# Patient Record
Sex: MALE | Employment: OTHER | URBAN - METROPOLITAN AREA
[De-identification: names, ages, dates, MRNs, and addresses within clinical notes are randomized per-mention and may not be internally consistent; named-entity substitution may affect disease eponyms.]

---

## 2023-08-21 LAB
ANION GAP IN SER/PLAS: 10 MMOL/L (ref 10–20)
CALCIUM (MG/DL) IN SER/PLAS: 9.5 MG/DL (ref 8.6–10.3)
CARBON DIOXIDE, TOTAL (MMOL/L) IN SER/PLAS: 28 MMOL/L (ref 21–32)
CHLORIDE (MMOL/L) IN SER/PLAS: 104 MMOL/L (ref 98–107)
CREATININE (MG/DL) IN SER/PLAS: 1.09 MG/DL (ref 0.5–1.3)
GFR MALE: 74 ML/MIN/1.73M2
GLUCOSE (MG/DL) IN SER/PLAS: 109 MG/DL (ref 74–99)
MAGNESIUM (MG/DL) IN SER/PLAS: 2.07 MG/DL (ref 1.6–2.4)
NATRIURETIC PEPTIDE B (PG/ML) IN SER/PLAS: 123 PG/ML (ref 0–99)
POTASSIUM (MMOL/L) IN SER/PLAS: 4.8 MMOL/L (ref 3.5–5.3)
SODIUM (MMOL/L) IN SER/PLAS: 137 MMOL/L (ref 136–145)
UREA NITROGEN (MG/DL) IN SER/PLAS: 12 MG/DL (ref 6–23)

## 2024-01-13 DIAGNOSIS — E78.5 DYSLIPIDEMIA: ICD-10-CM

## 2024-01-13 DIAGNOSIS — I50.20 HFREF (HEART FAILURE WITH REDUCED EJECTION FRACTION) (MULTI): ICD-10-CM

## 2024-01-13 DIAGNOSIS — I25.10 CORONARY ARTERY DISEASE INVOLVING NATIVE CORONARY ARTERY OF NATIVE HEART, UNSPECIFIED WHETHER ANGINA PRESENT: Primary | ICD-10-CM

## 2024-01-18 PROBLEM — I10 ESSENTIAL HYPERTENSION: Status: ACTIVE | Noted: 2024-01-18

## 2024-01-18 PROBLEM — E78.5 DYSLIPIDEMIA: Status: ACTIVE | Noted: 2024-01-18

## 2024-01-18 PROBLEM — I50.20 HFREF (HEART FAILURE WITH REDUCED EJECTION FRACTION) (MULTI): Status: ACTIVE | Noted: 2024-01-18

## 2024-01-18 PROBLEM — I25.10 CAD (CORONARY ARTERY DISEASE): Status: ACTIVE | Noted: 2024-01-18

## 2024-01-18 RX ORDER — ROSUVASTATIN CALCIUM 40 MG/1
40 TABLET, COATED ORAL DAILY
Qty: 90 TABLET | Refills: 0 | OUTPATIENT
Start: 2024-01-18

## 2024-01-18 RX ORDER — SPIRONOLACTONE 25 MG/1
25 TABLET ORAL DAILY
Qty: 30 TABLET | Refills: 1 | Status: SHIPPED | OUTPATIENT
Start: 2024-01-18 | End: 2024-03-28 | Stop reason: SDUPTHER

## 2024-01-18 RX ORDER — ROSUVASTATIN CALCIUM 40 MG/1
40 TABLET, COATED ORAL DAILY
Qty: 30 TABLET | Refills: 1 | Status: SHIPPED | OUTPATIENT
Start: 2024-01-18 | End: 2024-03-28 | Stop reason: SDUPTHER

## 2024-01-18 RX ORDER — ROSUVASTATIN CALCIUM 40 MG/1
40 TABLET, COATED ORAL DAILY
COMMUNITY
End: 2024-01-18 | Stop reason: SDUPTHER

## 2024-03-09 DIAGNOSIS — E78.5 DYSLIPIDEMIA: Primary | ICD-10-CM

## 2024-03-16 ENCOUNTER — LAB (OUTPATIENT)
Dept: LAB | Facility: LAB | Age: 69
End: 2024-03-16
Payer: MEDICARE

## 2024-03-16 DIAGNOSIS — I10 ESSENTIAL (PRIMARY) HYPERTENSION: ICD-10-CM

## 2024-03-16 DIAGNOSIS — E78.5 HYPERLIPIDEMIA, UNSPECIFIED: ICD-10-CM

## 2024-03-16 DIAGNOSIS — E66.9 OBESITY, UNSPECIFIED: ICD-10-CM

## 2024-03-16 DIAGNOSIS — I50.20 UNSPECIFIED SYSTOLIC (CONGESTIVE) HEART FAILURE (MULTI): ICD-10-CM

## 2024-03-16 DIAGNOSIS — I25.10 ATHEROSCLEROTIC HEART DISEASE OF NATIVE CORONARY ARTERY WITHOUT ANGINA PECTORIS: ICD-10-CM

## 2024-03-16 DIAGNOSIS — E78.5 HYPERLIPIDEMIA, UNSPECIFIED: Primary | ICD-10-CM

## 2024-03-16 LAB
ALBUMIN SERPL BCP-MCNC: 4.2 G/DL (ref 3.4–5)
ALP SERPL-CCNC: 63 U/L (ref 33–136)
ALT SERPL W P-5'-P-CCNC: 31 U/L (ref 10–52)
ANION GAP SERPL CALC-SCNC: 12 MMOL/L (ref 10–20)
AST SERPL W P-5'-P-CCNC: 23 U/L (ref 9–39)
BILIRUB SERPL-MCNC: 0.7 MG/DL (ref 0–1.2)
BNP SERPL-MCNC: 105 PG/ML (ref 0–99)
BUN SERPL-MCNC: 12 MG/DL (ref 6–23)
CALCIUM SERPL-MCNC: 9.1 MG/DL (ref 8.6–10.3)
CHLORIDE SERPL-SCNC: 104 MMOL/L (ref 98–107)
CHOLEST SERPL-MCNC: 99 MG/DL (ref 0–199)
CHOLESTEROL/HDL RATIO: 3
CO2 SERPL-SCNC: 25 MMOL/L (ref 21–32)
CREAT SERPL-MCNC: 1.02 MG/DL (ref 0.5–1.3)
EGFRCR SERPLBLD CKD-EPI 2021: 80 ML/MIN/1.73M*2
ERYTHROCYTE [DISTWIDTH] IN BLOOD BY AUTOMATED COUNT: 12.8 % (ref 11.5–14.5)
GLUCOSE SERPL-MCNC: 121 MG/DL (ref 74–99)
HCT VFR BLD AUTO: 44.5 % (ref 41–52)
HDLC SERPL-MCNC: 33.3 MG/DL
HGB BLD-MCNC: 15.5 G/DL (ref 13.5–17.5)
LDLC SERPL CALC-MCNC: 48 MG/DL
MAGNESIUM SERPL-MCNC: 1.9 MG/DL (ref 1.6–2.4)
MCH RBC QN AUTO: 33.2 PG (ref 26–34)
MCHC RBC AUTO-ENTMCNC: 34.8 G/DL (ref 32–36)
MCV RBC AUTO: 95 FL (ref 80–100)
NON HDL CHOLESTEROL: 66 MG/DL (ref 0–149)
NRBC BLD-RTO: 0 /100 WBCS (ref 0–0)
PLATELET # BLD AUTO: 153 X10*3/UL (ref 150–450)
POTASSIUM SERPL-SCNC: 4.3 MMOL/L (ref 3.5–5.3)
PROT SERPL-MCNC: 7 G/DL (ref 6.4–8.2)
RBC # BLD AUTO: 4.67 X10*6/UL (ref 4.5–5.9)
SODIUM SERPL-SCNC: 137 MMOL/L (ref 136–145)
TRIGL SERPL-MCNC: 89 MG/DL (ref 0–149)
VLDL: 18 MG/DL (ref 0–40)
WBC # BLD AUTO: 7 X10*3/UL (ref 4.4–11.3)

## 2024-03-16 PROCEDURE — 36415 COLL VENOUS BLD VENIPUNCTURE: CPT

## 2024-03-16 PROCEDURE — 83735 ASSAY OF MAGNESIUM: CPT

## 2024-03-16 PROCEDURE — 80061 LIPID PANEL: CPT

## 2024-03-16 PROCEDURE — 85027 COMPLETE CBC AUTOMATED: CPT

## 2024-03-16 PROCEDURE — 83880 ASSAY OF NATRIURETIC PEPTIDE: CPT

## 2024-03-16 PROCEDURE — 80053 COMPREHEN METABOLIC PANEL: CPT

## 2024-03-18 PROBLEM — E66.9 OBESITY (BMI 30-39.9): Status: ACTIVE | Noted: 2024-03-18

## 2024-03-18 RX ORDER — EZETIMIBE 10 MG/1
10 TABLET ORAL NIGHTLY
Qty: 90 TABLET | Refills: 3 | Status: SHIPPED | OUTPATIENT
Start: 2024-03-18

## 2024-03-18 NOTE — PROGRESS NOTES
[facility] Heart and Vascular Cardiology    Patient Name: Wing Stone  Patient : 1955      Scribe Attestation  By signing my name below, IDenisha Scribe   attest that this documentation has been prepared under the direction and in the presence of Shailesh Dash DO.      Reason for visit:  This is a 68-year-old male here for follow-up regarding HFrEF with an ejection fraction of 45%, coronary artery disease with bypass done in , hypertension, dyslipidemia, and obesity.     HPI:  This is a 68-year-old male here for follow-up regarding HFrEF with an ejection fraction of 45%, coronary artery disease with bypass done in , hypertension, dyslipidemia, and obesity.  The patient was last evaluated by me in 2023.  At that visit I increased losartan to 50 mg daily, ordered blood work including CMP/lipid/magnesium/CBC/BNP be drawn in 6 months, and asked the patient to follow-up in 6 months and sooner if necessary. CMP done on 3/16/24 showed normal serum sodium and serum potassium with a serum creatinine of 1.02, normal ALT/AST, serum magnesium 1.90, . CBC showed a hemoglobin of 15.5. Lipid panel done in 2024 showed an LDL of 48 and triglycerides of 89 currently on rosuvastatin 40 mg daily. ECG done today showed sinus rhythm with a heart rate of 64 bpm.  The patient reports that he has been feeling generally well from the cardiac standpoint. He denies any new chest pain, shortness of breath, palpitations and lightheadedness. He reports that his usual systolic blood pressure at home is in the 130s range. He states that he takes all of his medications as prescribed. During my exam, he was resting comfortably on the exam table.            Assessment/Plan:   1. HFrEF  The patient has a history of HFrEF with an ejection fraction of 45%.  Echocardiogram done on 2023 showed mildly decreased left ventricular systolic function with a 45% estimated ejection fraction,  apical and anterolateral segments appear hypokinetic. There is mildly reduced right ventricular systolic function.  He does not appear significantly volume overloaded on exam today except for trace to 1+ pitting bilateral lower extremity edema on exam today.  He should continue his current heart failure medications.   Recent lab works as noted in the HPI.  Lab works as noted below will be done in 6 months prior to his next visit.   I discussed with him the importance of following a low-sodium heart healthy diet as well as weight loss.   Follow up in 6 months and sooner if necessary.      2. Coronary artery disease  Patient has a history of coronary artery disease with prior bypass done in 2013.  ECG done today showed sinus rhythm with a heart rate of 64 bpm.    He denies anginal chest discomfort.  Blood pressure appears moderately controlled on exam today.  He reports that his usual systolic blood pressure at home is in the 130s range.   He should continue his current antihypertensive medications.   Echocardiogram done on 2/16/2023 showed mildly decreased left ventricular systolic function with a 45% estimated ejection fraction, apical and anterolateral segments appear hypokinetic. There is mildly reduced right ventricular systolic function.  Recent lab works as noted in the HPI.   Lipid panel done in March 2024 showed an LDL of 48 and triglycerides of 89 currently on rosuvastatin 40 mg daily.   Lab works as noted below will be done in 6 months prior to his next visit.   Please also see lifestyle recommendations below.  Follow up in 6 months and sooner if necessary.      3. Hypertension  The patient has a history of hypertension which appears moderately controlled on exam today.  He reports that his usual systolic blood pressure at home is in the 130s range.   He should continue his current antihypertensive medications.     4. Dyslipidemia  Lipid panel done in March 2024 showed an LDL of 48 and triglycerides of 89  currently on rosuvastatin 40 mg daily.   Please see lifestyle recommendations below.     5. Obesity  Please see lifestyle recommendations below.       Orders:   BMP/BNP/magnesium in 6 months,   Follow-up in 6 months.    Lifestyle Recommendations  I recommend a whole-food plant-based diet, an eating pattern that encourages the consumption of unrefined plant foods (such as fruits, vegetables, tubers, whole grains, legumes, nuts and seeds) and discourages meats, dairy products, eggs and processed foods.     The AHA/ACC recommends that the patient consume a dietary pattern that emphasizes intake of vegetables, fruits, and whole grains; includes low-fat dairy products, poultry, fish, legumes, non-tropical vegetable oils, and nuts; and limits intake of sodium, sweets, sugar-sweetened beverages, and red meats.  Adapt this dietary pattern to appropriate calorie requirements (a 500-750 kcal/day deficit to loose weight), personal and cultural food preferences, and nutrition therapy for other medical conditions (including diabetes).  Achieve this pattern by following plans such as the [place] Mediterranean, DASH dietary pattern, or AHA diet.     Engage in 2 hours and 30 minutes per week of moderate-intensity physical activity, or 1 hour and 15 minutes (75 minutes) per week of vigorous-intensity aerobic physical activity, or an equivalent combination of moderate and vigorous-intensity aerobic physical activity. Aerobic activity should be performed in episodes of at least 10 minutes preferably spread throughout the week.     Adhering to a heart healthy diet, regular exercise habits, avoidance of tobacco products, and maintenance of a healthy weight are crucial components of their heart disease risk reduction.     Any positive review of systems not specifically addressed in the office visit today should be evaluated and treated by the patients primary care physician or in an emergency department if necessary     Patient was  "notified that results from ordered tests will be called to the patient if it changes current management; it will otherwise be discussed at a future appointment and available on [facility].     Thank you for allowing me to participate in the care of this patient.        This document was generated using the assistance of voice recognition software. If there are any errors of spelling, grammar, syntax, or meaning; please feel free to contact me directly for clarification.    Past Medical History:  He has no past medical history on file.    Past Surgical History:  He has a past surgical history that includes Other surgical history (04/06/2022) and Other surgical history (08/11/2022).      Social History:  He has no history on file for tobacco use, alcohol use, and drug use.    Family History:  No family history on file.     Allergies:  Patient has no allergy information on record.    Outpatient Medications:  Current Outpatient Medications   Medication Instructions    rosuvastatin (CRESTOR) 40 mg, oral, Daily    spironolactone (ALDACTONE) 25 mg, oral, Daily        ROS:  A 14 point review of systems was done and is negative other than as stated in HPI    Vitals:      2/24/2022     4:06 PM 3/28/2022     4:03 PM 4/4/2022     3:40 PM 5/13/2022     3:47 PM 8/11/2022     1:18 PM 2/23/2023    12:41 PM 9/22/2023     1:24 PM   Vitals   Systolic 136 134 150 146 118 130 148   Diastolic 78 80 80 88 70 82 74   Heart Rate 68 66 70 74 54 64 59   Resp 16 16 16 16 16     Height (in) 1.753 m (5' 9\") 1.753 m (5' 9\") 1.753 m (5' 9\") 1.753 m (5' 9\") 1.753 m (5' 9\") 1.753 m (5' 9\") 1.753 m (5' 9\")   Weight (lb) 253 256 245 259 255.13 249 245.38   BMI 37.36 kg/m2 37.8 kg/m2 36.18 kg/m2 38.25 kg/m2 37.68 kg/m2 36.77 kg/m2 36.24 kg/m2   BSA (m2) 2.37 m2 2.38 m2 2.32 m2 2.39 m2 2.38 m2 2.35 m2 2.32 m2        Physical Exam:   Constitutional: Cooperative, in no acute distress, alert, appears stated age.  Skin: Skin color, texture, turgor normal. " No rashes or lesions.  Head: Normocephalic. No masses, lesions, tenderness or abnormalities  Eyes: Extraocular movements are grossly intact.  Mouth and throat: Mucous membranes moist  Neck: Neck supple, no carotid bruits, no JVD  Respiratory: Lungs clear to auscultation, no wheezing or rhonchi, no use of accessory muscles  Chest wall: No scars, normal excursion with respiration  Cardiovascular: Regular rhythm without murmur  Gastrointestinal: Abdomen soft, nontender. Bowel sounds normal.  Musculoskeletal: Strength equal in upper extremities  Extremities: Trace to 1+ pitting edema  Neurologic: Sensation grossly intact, alert and oriented ×3    Intake/Output:   No intake/output data recorded.    Outpatient Medications  Current Outpatient Medications on File Prior to Visit   Medication Sig Dispense Refill    rosuvastatin (Crestor) 40 mg tablet Take 1 tablet (40 mg) by mouth once daily. 30 tablet 1    spironolactone (Aldactone) 25 mg tablet Take 1 tablet (25 mg) by mouth once daily. 30 tablet 1     No current facility-administered medications on file prior to visit.       Labs: (past 26 weeks)  Recent Results (from the past 4368 hour(s))   B-Type Natriuretic Peptide    Collection Time: 03/16/24  8:38 AM   Result Value Ref Range     (H) 0 - 99 pg/mL   CBC    Collection Time: 03/16/24  8:38 AM   Result Value Ref Range    WBC 7.0 4.4 - 11.3 x10*3/uL    nRBC 0.0 0.0 - 0.0 /100 WBCs    RBC 4.67 4.50 - 5.90 x10*6/uL    Hemoglobin 15.5 13.5 - 17.5 g/dL    Hematocrit 44.5 41.0 - 52.0 %    MCV 95 80 - 100 fL    MCH 33.2 26.0 - 34.0 pg    MCHC 34.8 32.0 - 36.0 g/dL    RDW 12.8 11.5 - 14.5 %    Platelets 153 150 - 450 x10*3/uL   Comprehensive Metabolic Panel    Collection Time: 03/16/24  8:38 AM   Result Value Ref Range    Glucose 121 (H) 74 - 99 mg/dL    Sodium 137 136 - 145 mmol/L    Potassium 4.3 3.5 - 5.3 mmol/L    Chloride 104 98 - 107 mmol/L    Bicarbonate 25 21 - 32 mmol/L    Anion Gap 12 10 - 20 mmol/L    Urea  Nitrogen 12 6 - 23 mg/dL    Creatinine 1.02 0.50 - 1.30 mg/dL    eGFR 80 >60 mL/min/1.73m*2    Calcium 9.1 8.6 - 10.3 mg/dL    Albumin 4.2 3.4 - 5.0 g/dL    Alkaline Phosphatase 63 33 - 136 U/L    Total Protein 7.0 6.4 - 8.2 g/dL    AST 23 9 - 39 U/L    Bilirubin, Total 0.7 0.0 - 1.2 mg/dL    ALT 31 10 - 52 U/L   Lipid Panel    Collection Time: 03/16/24  8:38 AM   Result Value Ref Range    Cholesterol 99 0 - 199 mg/dL    HDL-Cholesterol 33.3 mg/dL    Cholesterol/HDL Ratio 3.0     LDL Calculated 48 <=99 mg/dL    VLDL 18 0 - 40 mg/dL    Triglycerides 89 0 - 149 mg/dL    Non HDL Cholesterol 66 0 - 149 mg/dL   Magnesium    Collection Time: 03/16/24  8:38 AM   Result Value Ref Range    Magnesium 1.90 1.60 - 2.40 mg/dL       ECG  No results found for this or any previous visit (from the past 4464 hour(s)).    Echocardiogram  No results found for this or any previous visit from the past 1095 days.      CV Studies:  EKG: No results found for this or any previous visit (from the past 4464 hour(s)).  Echocardiogram:   [place] [address]  Phone [phone] Fax [phone]    TRANSTHORACIC ECHOCARDIOGRAM REPORT      Patient Name:     DEBBIE Oleary Physician:  [id number] Jaleel Bowman MD  Study Date:       2/16/2023      Referring           JUAN DAVID AMBROSIO  Physician:  N/PID:          [id number]       PCP:  Accession/Order#: [id number]   [facility] Echo Lab  Location:  YOB: 1955      Fellow:  Gender:           M              Nurse:              Delaney Patel RN  Admit Date:                      Sonographer:        Bharati Carolina RUST  Admission Status: Outpatient     Additional Staff:  Height:           175.26 cm      CC Report to:  Weight:           108.86 kg      Study Type:         Echocardiogram  BSA:              2.23 m2  Blood Pressure: 118 /70 mmHg    Diagnosis/ICD: R94.31-Abnormal electrocardiogram  [ECG] [EKG];  I25.10-Atherosclerotic heart disease of native coronary artery  without angina pectoris; I50.20-Unspecified systolic (congestive)  heart failure (CHF)  Indication:    Congestive Heart Failure, Abnormal EKG  Procedure/CPT: Echo Complete w Full Doppler-[id number]    Patient History:  Pertinent History: CAD, CABG (2013), HTN.    Study Detail: The following Echo studies were performed: 2D, M-Mode, Doppler and  color flow. Technically challenging study due to poor acoustic  windows. Optison used as a contrast agent for endocardial border  definition. Total contrast used for this procedure was 3 mL via IV  push.      PHYSICIAN INTERPRETATION:  Left Ventricle: Left ventricular systolic function is mildly decreased, with an estimated ejection fraction of 45%. Wall motion is abnormal. The left ventricular cavity size is normal. Left ventricular diastolic filling was not assessed. Apical and anterolateral segments appear hypokinetic.  Left Atrium: The left atrium is normal in size.  Right Ventricle: The right ventricle is normal in size. There is mildly reduced right ventricular systolic function.  Right Atrium: The right atrium is normal in size.  Aortic Valve: The aortic valve is trileaflet. There is no evidence of aortic valve regurgitation. The peak instantaneous gradient of the aortic valve is 7.4 mmHg. The mean gradient of the aortic valve is 4.1 mmHg.  Mitral Valve: The mitral valve is normal in structure. There is trace mitral valve regurgitation.  Tricuspid Valve: The tricuspid valve is structurally normal. No evidence of tricuspid regurgitation.  Pulmonic Valve: The pulmonic valve is structurally normal. There is physiologic pulmonic valve regurgitation.  Pericardium: There is no pericardial effusion noted.  Aorta: The aortic root is normal.      CONCLUSIONS:  1. Left ventricular systolic function is mildly decreased with a 45% estimated ejection fraction.  2. Apical and anterolateral segments appear  hypokinetic.  3. There is mildly reduced right ventricular systolic function.    QUANTITATIVE DATA SUMMARY:  2D MEASUREMENTS:  Normal Ranges:  LAs:           4.15 cm    (2.7-4.0cm)  IVSd:          1.04 cm    (0.6-1.1cm)  LVPWd:         1.00 cm    (0.6-1.1cm)  LVIDd:         6.26 cm    (3.9-5.9cm)  LVIDs:         5.26 cm  LV Mass Index: 121.8 g/m2  LV % FS        16.0 %    LA VOLUME:  Normal Ranges:  LA Vol A4C:        58.9 ml    (22+/-6mL/m2)  LA Vol A2C:        53.2 ml  LA Vol BP:         56.4 ml  LA Vol Index A4C:  26.4 ml/m2  LA Vol Index A2C:  23.8 ml/m2  LA Vol Index BP:   25.3 ml/m2  LA Area A4C:       20.9 cm2  LA Area A2C:       19.7 cm2  LA Major Axis A4C: 6.3 cm  LA Major Axis A2C: 6.2 cm  LA Volume Index:   25.3 ml/m2  LA Vol A4C:        55.7 ml  LA Vol A2C:        50.0 ml    RA VOLUME BY A/L METHOD:  Normal Ranges:  RA Area A4C: 18.5 cm2    AORTA MEASUREMENTS:  Normal Ranges:  Ao Sinus, d: 3.60 cm (2.1-3.5cm)  Ao STJ, d:   2.90 cm (1.7-3.4cm)  Asc Ao, d:   3.10 cm (2.1-3.4cm)    LV SYSTOLIC FUNCTION BY 2D PLANIMETRY (MOD):  Normal Ranges:  EF-A4C View: 45.1 % (>=55%)  EF-A2C View: 38.9 %  EF-Biplane:  44.1 %    LV DIASTOLIC FUNCTION:  Normal Ranges:  MV Peak E:        1.09 m/s    (0.7-1.2 m/s)  MV Peak A:        0.88 m/s    (0.42-0.7 m/s)  E/A Ratio:        1.24        (1.0-2.2)  MV A Dur:         121.79 msec  PulmV A Revs Dur: 163.65 msec    MITRAL VALVE:  Normal Ranges:  MV DT: 241 msec (150-240msec)    MITRAL INSUFFICIENCY:  Normal Ranges:  PISA Radius:  0.5 cm  MR VTI:       167.73 cm  MR Vmax:      483.82 cm/s  MR Alias Yasmany: 39.3 cm/s  MR Volume:    24.08 ml  MR Flow Rt:   69.47 ml/s  MR EROA:      0.14 cm2  dP/dt:        855 mmHg/s  (>1200mmHg/sec)    AORTIC VALVE:  Normal Ranges:  AoV Vmax:                1.36 m/s (<=1.7m/s)  AoV Peak P.4 mmHg (<20mmHg)  AoV Mean P.1 mmHg (1.7-11.5mmHg)  LVOT Max Yasmany:            1.01 m/s (<=1.1m/s)  AoV VTI:                 32.26 cm  (18-25cm)  LVOT VTI:                23.79 cm  LVOT Diameter:           2.24 cm  (1.8-2.4cm)  AoV Area, VTI:           2.90 cm2 (2.5-5.5cm2)  AoV Area,Vmax:           2.91 cm2 (2.5-4.5cm2)  AoV Dimensionless Index: 0.74    RIGHT VENTRICLE:  RV 1   3.4 cm  RV 2   2.5 cm  RV 3   7.7 cm  TAPSE: 19.6 mm  RV s'  0.11 m/s    TRICUSPID VALVE/RVSP:  Normal Ranges:  Peak TR Velocity: 2.24 m/s  RV Syst Pressure: 23.1 mmHg (< 30mmHg)  TV E Vmax:        0.46 m/s  (0.3-0.7m/s)  TV A Vmax:        0.45 m/s  IVC Diam:         1.60 cm  TV e'             0.1 m/s    Pulmonary Veins:  PulmV A Revs Dur: 163.65 msec    AORTA:  Asc Ao Diam 3.15 cm      [id number] Jaleel Bowman MD  Electronically signed on 2/16/2023 at 4:53:15 PM         Final     Stress Testing IMGRESULT(YMD4240:1:1825): No results found for this or any previous visit from the past 1825 days.    Cardiac Catheterization:   Adult Cath     [facility], Cath Lab  [address] [place] [address]  Phone [phone] Fax [phone]    Cardiovascular Catheterization Report    Patient Name:     DEBBIE ORR Performing Physician: [id number] Azar MD  Study Date:       4/21/2022      Verifying Physician:  [id number] Azar MD  MRN/PID:          [id number]       Cardiologist:  Accession/Order#: [id number]      Referring Physician:  [id number] Shailesh Dash DO  YOB: 1955      Referring Physician:  Gender:           M              Referring Physician:      Study: Left Heart Catheterization      Indications:  DEBBIE ORR is a 67 year old male who presents with hypertension, dyslipidemia, prior coronary artery bypass graft surgery and prior percutaneous coronary intervention. Cardiomyopathy and left ventricular dysfunction, with a chest pain assessment of atypical angina. Study performed as an elective cath procedure.    Medical History:  Stress test performed: No. CTA performed: No. Agatston accessed: No. LVEF Assessed: Yes.    Procedure  Description:  After infiltration with 2% Lidocaine, the right femoral artery was cannulated with a modified Seldinger technique. Subsequently a 6 Polish sheath was placed in the right femoral artery. Selective coronary catheterization was performed using a 6 Fr catheter(s) exchanged over a guide wire to cannulate the coronary arteries. A JL 4 tip catheter was used for left coronary injections. A JR 4 tip catheter was used for right coronary injections.  Multiple injections of contrast were made into the left and right coronary arteries with angiograms recorded in multiple projections. After completion of the procedure, femoral artery angiography was performed. This demonstrated a common femoral artery puncture appropriate for closure. An Angio-Seal Evolution 6F ([employer]) vascular closure device was placed per protocol.    Coronary Angiography:  The coronary circulation is right dominant.    Left Main Coronary Artery:  The left main coronary artery is a normal caliber vessel. The left main arises normally from the left coronary sinus of Valsalva and bifurcates into the LAD and circumflex coronary arteries. The left main coronary artery showed no significant disease or stenosis greater than 30%.    Left Anterior Descending Coronary Artery Distribution:  The left anterior descending coronary artery is a normal caliber vessel. The LAD arises normally from the left main coronary artery. The LAD demonstrated chronic occlusion and total occlusion.    Circumflex Coronary Artery Distribution:  The circumflex coronary artery is a normal caliber vessel. The circumflex arises normally from the left main coronary artery and terminates in the AV groove. The circumflex revealed mild atherosclerotic disease.    Right Coronary Artery Distribution:    The right coronary artery is a normal caliber vessel. The RCA arises normally from the right sinus of Valsalva. The RCA showed moderate atherosclerotic disease and diffuse  atherosclerotic disease.    Coronary Grafts:    LIMA Graft:  The left internal mammary artery graft conduit originating in situ and attached to the mid left anterior descending is widely patent.  Saphaneous Vein Graft(s):  The saphenous vein graft, originating from the aorta and attached to the 2nd obtuse marginal appeared totally occluded.  The 2nd saphenous vein graft, originating from the aorta and attached to the distal right coronary artery is patent.      Valve Findings:  No aortic valve stenosis is visualized.    Graft Stenosis Summary:  Graft     Destination of Graft  LIMA  mid left anterior descending  SVG 1 2nd obtuse marginal  SVG 2 distal right coronary artery        Complications:  No in-lab complications observed.    Cardiac Cath Transition of Care Summary:  Post Procedure Diagnosis: Triple vessel disease and Patent LIMA to LAD and SVG  to RCA.  Blood Loss:               Estimated blood loss during the procedure was 0 mls.  Specimens Removed:        Number of specimen(s) removed: none.      Recommendations:  Maximize medical therapy.    ____________________________________________________________________________________  CONCLUSIONS:  1. Triple vessel disease.  2. Patent SVG to RCA and LIMA to LAD and occluded SVG to OM.  3. Left Ventricular end-diastolic pressure = 15.  4. Normal LV filling pressures.    ____________________________________________________________________________________  CPT Codes:  Left Heart Cath Bypass Graft w ventriculography and coronary angio(C)-21946; Moderate Sedation Services initial 15 minutes patient >5 years-32817; Moderate Sedation Services 1st additional 15 minutes patient >5 years-22422    ICD 10 Codes:  I25.5-Ischemic cardiomyopathy    [id number] Andrea Azar MD  Performing Physician  Electronically signed by [id number] Andrea Azar MD on 4/21/2022 at 11:31:16 AM      cc Report to: [id number] Shailesh Dash DO           Final   No results found for this or any previous visit from the  past 3650 days.     Cardiac Scoring: No results found for this or any previous visit from the past 1825 days.    AAA : No results found for this or any previous visit from the past 1825 days.    OTHER: No results found for this or any previous visit from the past 1825 days.    LAST IMAGING RESULTS  ELECTROCARDIOGRAM RHYTHM STRIP  Ordered by an unspecified provider.    Problem List Items Addressed This Visit       CAD (coronary artery disease)    Dyslipidemia    Essential hypertension    HFrEF (heart failure with reduced ejection fraction) (CMS/[facility]) - Primary    Obesity (BMI 30-39.9)         Shailesh Dash DO, FACC, FACOI

## 2024-03-22 ENCOUNTER — OFFICE VISIT (OUTPATIENT)
Dept: CARDIOLOGY | Facility: CLINIC | Age: 69
End: 2024-03-22
Payer: MEDICARE

## 2024-03-22 VITALS
BODY MASS INDEX: 36.56 KG/M2 | WEIGHT: 246.8 LBS | DIASTOLIC BLOOD PRESSURE: 74 MMHG | HEIGHT: 69 IN | HEART RATE: 64 BPM | SYSTOLIC BLOOD PRESSURE: 144 MMHG

## 2024-03-22 DIAGNOSIS — E66.9 OBESITY (BMI 30-39.9): ICD-10-CM

## 2024-03-22 DIAGNOSIS — I50.20 HFREF (HEART FAILURE WITH REDUCED EJECTION FRACTION) (MULTI): Primary | ICD-10-CM

## 2024-03-22 DIAGNOSIS — I25.10 CORONARY ARTERY DISEASE INVOLVING NATIVE CORONARY ARTERY OF NATIVE HEART WITHOUT ANGINA PECTORIS: ICD-10-CM

## 2024-03-22 DIAGNOSIS — E78.5 DYSLIPIDEMIA: ICD-10-CM

## 2024-03-22 DIAGNOSIS — I10 ESSENTIAL HYPERTENSION: ICD-10-CM

## 2024-03-22 PROCEDURE — 1159F MED LIST DOCD IN RCRD: CPT | Performed by: INTERNAL MEDICINE

## 2024-03-22 PROCEDURE — 3077F SYST BP >= 140 MM HG: CPT | Performed by: INTERNAL MEDICINE

## 2024-03-22 PROCEDURE — 99214 OFFICE O/P EST MOD 30 MIN: CPT | Performed by: INTERNAL MEDICINE

## 2024-03-22 PROCEDURE — 93000 ELECTROCARDIOGRAM COMPLETE: CPT | Performed by: INTERNAL MEDICINE

## 2024-03-22 PROCEDURE — 3078F DIAST BP <80 MM HG: CPT | Performed by: INTERNAL MEDICINE

## 2024-03-22 RX ORDER — NAPROXEN SODIUM 220 MG
1 TABLET ORAL 3 TIMES DAILY PRN
COMMUNITY

## 2024-03-22 RX ORDER — CARVEDILOL 25 MG/1
25 TABLET ORAL
COMMUNITY

## 2024-03-22 RX ORDER — LOSARTAN POTASSIUM 25 MG/1
25 TABLET ORAL DAILY
COMMUNITY
Start: 2023-08-15 | End: 2024-03-28 | Stop reason: SDUPTHER

## 2024-03-22 RX ORDER — ASPIRIN 81 MG/1
TABLET ORAL
COMMUNITY
Start: 2022-05-13

## 2024-03-22 RX ORDER — DAPAGLIFLOZIN 10 MG/1
10 TABLET, FILM COATED ORAL
COMMUNITY
End: 2024-04-08

## 2024-03-22 RX ORDER — IBUPROFEN 100 MG/5ML
SUSPENSION, ORAL (FINAL DOSE FORM) ORAL
COMMUNITY

## 2024-03-22 RX ORDER — ACETAMINOPHEN 500 MG
TABLET ORAL
COMMUNITY

## 2024-03-27 DIAGNOSIS — I50.20 HFREF (HEART FAILURE WITH REDUCED EJECTION FRACTION) (MULTI): ICD-10-CM

## 2024-03-30 DIAGNOSIS — I50.20 HFREF (HEART FAILURE WITH REDUCED EJECTION FRACTION) (MULTI): Primary | ICD-10-CM

## 2024-04-06 RX ORDER — SPIRONOLACTONE 25 MG/1
25 TABLET ORAL DAILY
Qty: 30 TABLET | Refills: 0 | OUTPATIENT
Start: 2024-04-06

## 2024-04-08 RX ORDER — DAPAGLIFLOZIN 10 MG/1
10 TABLET, FILM COATED ORAL
Qty: 90 TABLET | Refills: 1 | Status: SHIPPED | OUTPATIENT
Start: 2024-04-08

## 2024-09-14 ENCOUNTER — LAB (OUTPATIENT)
Dept: LAB | Facility: LAB | Age: 69
End: 2024-09-14
Payer: MEDICARE

## 2024-09-14 DIAGNOSIS — I10 ESSENTIAL HYPERTENSION: ICD-10-CM

## 2024-09-14 DIAGNOSIS — E66.9 OBESITY (BMI 30-39.9): ICD-10-CM

## 2024-09-14 DIAGNOSIS — E78.5 DYSLIPIDEMIA: ICD-10-CM

## 2024-09-14 DIAGNOSIS — I50.20 HFREF (HEART FAILURE WITH REDUCED EJECTION FRACTION) (MULTI): ICD-10-CM

## 2024-09-14 DIAGNOSIS — I25.10 CORONARY ARTERY DISEASE INVOLVING NATIVE CORONARY ARTERY OF NATIVE HEART WITHOUT ANGINA PECTORIS: ICD-10-CM

## 2024-09-14 LAB
ANION GAP SERPL CALC-SCNC: 10 MMOL/L (ref 10–20)
BNP SERPL-MCNC: 111 PG/ML (ref 0–99)
BUN SERPL-MCNC: 9 MG/DL (ref 6–23)
CALCIUM SERPL-MCNC: 8.8 MG/DL (ref 8.6–10.3)
CHLORIDE SERPL-SCNC: 106 MMOL/L (ref 98–107)
CO2 SERPL-SCNC: 26 MMOL/L (ref 21–32)
CREAT SERPL-MCNC: 0.95 MG/DL (ref 0.5–1.3)
EGFRCR SERPLBLD CKD-EPI 2021: 87 ML/MIN/1.73M*2
GLUCOSE SERPL-MCNC: 117 MG/DL (ref 74–99)
MAGNESIUM SERPL-MCNC: 2 MG/DL (ref 1.6–2.4)
POTASSIUM SERPL-SCNC: 4.4 MMOL/L (ref 3.5–5.3)
SODIUM SERPL-SCNC: 138 MMOL/L (ref 136–145)

## 2024-09-14 PROCEDURE — 80048 BASIC METABOLIC PNL TOTAL CA: CPT

## 2024-09-14 PROCEDURE — 83880 ASSAY OF NATRIURETIC PEPTIDE: CPT

## 2024-09-14 PROCEDURE — 83735 ASSAY OF MAGNESIUM: CPT

## 2024-09-14 PROCEDURE — 36415 COLL VENOUS BLD VENIPUNCTURE: CPT

## 2024-09-19 PROBLEM — I50.22 HEART FAILURE WITH MID-RANGE EJECTION FRACTION (HFMEF): Status: ACTIVE | Noted: 2024-09-19

## 2024-09-25 ENCOUNTER — APPOINTMENT (OUTPATIENT)
Dept: CARDIOLOGY | Facility: CLINIC | Age: 69
End: 2024-09-25
Payer: MEDICARE

## 2024-09-25 VITALS
SYSTOLIC BLOOD PRESSURE: 132 MMHG | DIASTOLIC BLOOD PRESSURE: 78 MMHG | BODY MASS INDEX: 36.17 KG/M2 | HEART RATE: 65 BPM | HEIGHT: 69 IN | WEIGHT: 244.2 LBS

## 2024-09-25 DIAGNOSIS — I50.22 HEART FAILURE WITH MID-RANGE EJECTION FRACTION (HFMEF): Primary | ICD-10-CM

## 2024-09-25 DIAGNOSIS — R09.89 BRUIT OF RIGHT CAROTID ARTERY: ICD-10-CM

## 2024-09-25 DIAGNOSIS — I10 ESSENTIAL HYPERTENSION: ICD-10-CM

## 2024-09-25 DIAGNOSIS — I25.10 CORONARY ARTERY DISEASE INVOLVING NATIVE CORONARY ARTERY OF NATIVE HEART WITHOUT ANGINA PECTORIS: ICD-10-CM

## 2024-09-25 DIAGNOSIS — E78.5 DYSLIPIDEMIA: ICD-10-CM

## 2024-09-25 DIAGNOSIS — E66.9 OBESITY (BMI 30-39.9): ICD-10-CM

## 2024-09-25 PROCEDURE — 3008F BODY MASS INDEX DOCD: CPT | Performed by: INTERNAL MEDICINE

## 2024-09-25 PROCEDURE — 3075F SYST BP GE 130 - 139MM HG: CPT | Performed by: INTERNAL MEDICINE

## 2024-09-25 PROCEDURE — 93010 ELECTROCARDIOGRAM REPORT: CPT | Performed by: INTERNAL MEDICINE

## 2024-09-25 PROCEDURE — 99214 OFFICE O/P EST MOD 30 MIN: CPT | Performed by: INTERNAL MEDICINE

## 2024-09-25 PROCEDURE — 1159F MED LIST DOCD IN RCRD: CPT | Performed by: INTERNAL MEDICINE

## 2024-09-25 PROCEDURE — 3078F DIAST BP <80 MM HG: CPT | Performed by: INTERNAL MEDICINE

## 2024-09-25 PROCEDURE — 93005 ELECTROCARDIOGRAM TRACING: CPT | Performed by: INTERNAL MEDICINE

## 2024-09-26 DIAGNOSIS — I50.20 HFREF (HEART FAILURE WITH REDUCED EJECTION FRACTION): ICD-10-CM

## 2024-09-27 DIAGNOSIS — I10 ESSENTIAL HYPERTENSION: ICD-10-CM

## 2024-09-27 RX ORDER — SPIRONOLACTONE 25 MG/1
25 TABLET ORAL DAILY
Qty: 90 TABLET | Refills: 1 | Status: SHIPPED | OUTPATIENT
Start: 2024-09-27

## 2024-09-28 DIAGNOSIS — I50.20 HFREF (HEART FAILURE WITH REDUCED EJECTION FRACTION): ICD-10-CM

## 2024-10-01 DIAGNOSIS — I10 ESSENTIAL HYPERTENSION: ICD-10-CM

## 2024-10-01 DIAGNOSIS — I50.20 HFREF (HEART FAILURE WITH REDUCED EJECTION FRACTION): ICD-10-CM

## 2024-10-01 RX ORDER — LOSARTAN POTASSIUM 50 MG/1
50 TABLET ORAL DAILY
Qty: 90 TABLET | Refills: 0 | OUTPATIENT
Start: 2024-10-01

## 2024-10-01 RX ORDER — DAPAGLIFLOZIN 10 MG/1
10 TABLET, FILM COATED ORAL
Qty: 90 TABLET | Refills: 0 | OUTPATIENT
Start: 2024-10-01

## 2024-10-01 RX ORDER — DAPAGLIFLOZIN 10 MG/1
10 TABLET, FILM COATED ORAL
Qty: 90 TABLET | Refills: 1 | Status: SHIPPED | OUTPATIENT
Start: 2024-10-01

## 2024-10-01 RX ORDER — LOSARTAN POTASSIUM 50 MG/1
50 TABLET ORAL DAILY
Qty: 90 TABLET | Refills: 1 | Status: SHIPPED | OUTPATIENT
Start: 2024-10-01

## 2024-10-03 ENCOUNTER — TELEPHONE (OUTPATIENT)
Dept: CARDIOLOGY | Facility: HOSPITAL | Age: 69
End: 2024-10-03
Payer: MEDICARE

## 2024-10-03 NOTE — TELEPHONE ENCOUNTER
10/3/24  1255  Called patient; no answer. Left voice message for patient that no other less expensive alternative to Farxiga and to return call if interested in referral to clinical pharmacy.    ----- Message from Nurse June TATE sent at 10/2/2024 12:20 PM EDT -----  Regarding: Farxiga  Patient's spouse called stating the insurance will not cover the Farxiga that was sent in yesterday by Dr. Dash. It is nearly $600 for 30 day supply. They are wondering if there is something else he can take? Please call them back to discuss.

## 2024-10-07 ENCOUNTER — TELEPHONE (OUTPATIENT)
Dept: CARDIOLOGY | Facility: HOSPITAL | Age: 69
End: 2024-10-07
Payer: MEDICARE

## 2024-10-07 NOTE — TELEPHONE ENCOUNTER
10/8/24  1158  Called patient; no answer. Left voice message for patient to return call to discuss referral to clinical pharmacy.    Will closed encounter due to inability to get hold of patient.      10/7/24  1247  Called patient; no answer. Left voice message for patient to return call to discuss referral to clinical pharmacy.    ----- Message from Nurse June TATE sent at 10/2/2024 12:20 PM EDT -----  Regarding: Farxiga  Patient's spouse called stating the insurance will not cover the Farxiga that was sent in yesterday by Dr. Dash. It is nearly $600 for 30 day supply. They are wondering if there is something else he can take? Please call them back to discuss.

## 2024-10-08 NOTE — TELEPHONE ENCOUNTER
----- Message from Nurse June TATE sent at 10/2/2024 12:20 PM EDT -----  Regarding: Farxiga  Patient's spouse called stating the insurance will not cover the Farxiga that was sent in yesterday by Dr. Dash. It is nearly $600 for 30 day supply. They are wondering if there is something else he can take? Please call them back to discuss.

## 2024-10-10 ENCOUNTER — TELEPHONE (OUTPATIENT)
Dept: CARDIOLOGY | Facility: HOSPITAL | Age: 69
End: 2024-10-10
Payer: MEDICARE

## 2024-10-10 NOTE — TELEPHONE ENCOUNTER
Patient's wife (Natalie) left msg on voicemail stating she would like to speak with Riky [phone]

## 2024-10-11 ENCOUNTER — TELEPHONE (OUTPATIENT)
Dept: CARDIOLOGY | Facility: HOSPITAL | Age: 69
End: 2024-10-11
Payer: MEDICARE

## 2024-10-11 DIAGNOSIS — I50.22 HEART FAILURE WITH MID-RANGE EJECTION FRACTION (HFMEF): Primary | ICD-10-CM

## 2024-10-11 NOTE — TELEPHONE ENCOUNTER
10/11/234  1017  Wife of patient called in to request referral to clinical pharmacy; and for her to be notified.    Referral placed for clinical pharmacy for assistance with Farxiga.

## 2024-10-23 ENCOUNTER — TELEPHONE (OUTPATIENT)
Dept: CARDIOLOGY | Facility: HOSPITAL | Age: 69
End: 2024-10-23

## 2024-10-23 ENCOUNTER — HOSPITAL ENCOUNTER (OUTPATIENT)
Dept: VASCULAR MEDICINE | Facility: HOSPITAL | Age: 69
Discharge: HOME | End: 2024-10-23
Payer: MEDICARE

## 2024-10-23 DIAGNOSIS — I50.22 HEART FAILURE WITH MID-RANGE EJECTION FRACTION (HFMEF): ICD-10-CM

## 2024-10-23 DIAGNOSIS — I10 ESSENTIAL HYPERTENSION: ICD-10-CM

## 2024-10-23 DIAGNOSIS — E66.9 OBESITY (BMI 30-39.9): ICD-10-CM

## 2024-10-23 DIAGNOSIS — E78.5 DYSLIPIDEMIA: ICD-10-CM

## 2024-10-23 DIAGNOSIS — R09.89 BRUIT OF RIGHT CAROTID ARTERY: ICD-10-CM

## 2024-10-23 DIAGNOSIS — I25.10 CORONARY ARTERY DISEASE INVOLVING NATIVE CORONARY ARTERY OF NATIVE HEART WITHOUT ANGINA PECTORIS: ICD-10-CM

## 2024-10-23 PROCEDURE — 93880 EXTRACRANIAL BILAT STUDY: CPT

## 2024-10-23 PROCEDURE — 93880 EXTRACRANIAL BILAT STUDY: CPT | Performed by: SURGERY

## 2024-10-23 NOTE — TELEPHONE ENCOUNTER
10/23 4:30pm - Patient's wife called for results explanation from vascular US today. Routing to Riky DE LEON.    This was already sent to pharmacy on 24th     I sent My chart message to patient asking if she picked that up.  The pharmacy confirmed report on 11/24/21

## 2024-10-23 NOTE — TELEPHONE ENCOUNTER
10/23/24  1647  Called patient; no answer. Left voice message for patient to return call for results and directives from Dr. Dash.      ----- Message from Shailesh Dash sent at 10/23/2024  3:40 PM EDT -----  Please call the patient regarding his abnormal result.  50 to 69% left ICA stenosis, less than 50% right ICA stenosis.  Please refer patient to vascular surgery for evaluation.

## 2024-10-24 ENCOUNTER — TELEPHONE (OUTPATIENT)
Dept: CARDIOLOGY | Facility: HOSPITAL | Age: 69
End: 2024-10-24
Payer: MEDICARE

## 2024-10-24 DIAGNOSIS — I65.22 MORE THAN 50 PERCENT STENOSIS OF LEFT INTERNAL CAROTID ARTERY: Primary | ICD-10-CM

## 2024-10-24 NOTE — TELEPHONE ENCOUNTER
10/24/24  1102  Called carotid ultrasound results to wife of patient and plan for vascular surgery referral. Wife of patient verbalized understanding of results and is agreeable to plan.    Referral placed; will task for scheduling.    ----- Message from Shailesh Dash sent at 10/23/2024  3:40 PM EDT -----  Please call the patient regarding his abnormal result.  50 to 69% left ICA stenosis, less than 50% right ICA stenosis.  Please refer patient to vascular surgery for evaluation.

## 2024-11-20 ENCOUNTER — OFFICE VISIT (OUTPATIENT)
Dept: VASCULAR SURGERY | Facility: HOSPITAL | Age: 69
End: 2024-11-20
Payer: MEDICARE

## 2024-11-20 VITALS
HEART RATE: 72 BPM | DIASTOLIC BLOOD PRESSURE: 77 MMHG | WEIGHT: 245 LBS | SYSTOLIC BLOOD PRESSURE: 138 MMHG | BODY MASS INDEX: 36.18 KG/M2

## 2024-11-20 DIAGNOSIS — I65.22 MORE THAN 50 PERCENT STENOSIS OF LEFT INTERNAL CAROTID ARTERY: ICD-10-CM

## 2024-11-20 DIAGNOSIS — I65.22 LEFT CAROTID ARTERY STENOSIS: Primary | ICD-10-CM

## 2024-11-20 PROCEDURE — 99204 OFFICE O/P NEW MOD 45 MIN: CPT | Performed by: PHYSICIAN ASSISTANT

## 2024-11-20 PROCEDURE — 1159F MED LIST DOCD IN RCRD: CPT | Performed by: PHYSICIAN ASSISTANT

## 2024-11-20 PROCEDURE — 99214 OFFICE O/P EST MOD 30 MIN: CPT | Performed by: PHYSICIAN ASSISTANT

## 2024-11-20 PROCEDURE — 3075F SYST BP GE 130 - 139MM HG: CPT | Performed by: PHYSICIAN ASSISTANT

## 2024-11-20 PROCEDURE — 3078F DIAST BP <80 MM HG: CPT | Performed by: PHYSICIAN ASSISTANT

## 2024-11-20 ASSESSMENT — ENCOUNTER SYMPTOMS
ABDOMINAL PAIN: 0
TROUBLE SWALLOWING: 0
JOINT SWELLING: 0
LIGHT-HEADEDNESS: 0
HEADACHES: 0
SEIZURES: 0
VOMITING: 0
FATIGUE: 0
SPEECH DIFFICULTY: 0
DIFFICULTY URINATING: 0
CONSTIPATION: 0
SORE THROAT: 0
CONFUSION: 0
WEAKNESS: 0
NECK PAIN: 0
NAUSEA: 0
WHEEZING: 0
WOUND: 0
FEVER: 0
NUMBNESS: 0
CHILLS: 0
ADENOPATHY: 0
SLEEP DISTURBANCE: 0
ARTHRALGIAS: 0
FACIAL ASYMMETRY: 0
PALPITATIONS: 0
DIARRHEA: 0
SHORTNESS OF BREATH: 0
COLOR CHANGE: 0
COUGH: 0
DIZZINESS: 0

## 2024-11-20 NOTE — PROGRESS NOTES
Subjective   Patient ID: Wing Stone is a 69 y.o. male who presents for New Patient Visit (NPV- more then 50% left carotid stenosis ).  HPI  69 year old male with past medical history of CAD s/p CABG 2013, HTN, HLD, CHF LVEF 45%, obesity, presents to the office as a new patient for evaluation of left carotid stenosis. Referred by his cardiologist Dr Dash. Left carotid bruit heard on examination at Cardiology office, carotid duplex with 50-69% left carotid stenosis. He is asymptomatic denies history of TIA/CVA. No amaurosis fugax. Does occasionally get bilateral eye floaters. He is a former smoker quit 20 years ago. Takes asa81 daily.    10/23/24 Carotid Duplex  CONCLUSIONS:  Right Carotid: Findings are consistent with less than 50% stenosis of the right proximal internal carotid artery. Right external carotid artery appears patent with no evidence of stenosis. The right vertebral artery is patent with antegrade flow.  Left Carotid: Findings are consistent with 50 to 69% stenosis of the left proximal internal carotid artery. Turbulent flow seen by color Doppler. Left external carotid artery appears patent with no evidence of stenosis. The left vertebral artery is patent with antegrade flow. No evidence of hemodynamically significant stenosis in the left subclavian artery.     Imaging & Doppler Findings:  Right Plaque Morph: The proximal right internal carotid artery demonstrates smooth and calcified plaque. The proximal right external carotid artery demonstrates calcified plaque.  Left Plaque Morph: The proximal left internal carotid artery demonstrates smooth plaque. The proximal left external carotid artery demonstrates smooth plaque.      Right                       Left    PSV      EDV               PSV      EDV  100 cm/s           CCA P   123 cm/s  114 cm/s           CCA D   98 cm/s  123 cm/s 32 cm/s   ICA P   213 cm/s 66 cm/s  80 cm/s  24 cm/s   ICA D   80 cm/s  23 cm/s  157 cm/s            ECA    125  cm/s  62 cm/s  20 cm/s Vertebral 54 cm/s  15 cm/s      Right                                  Left    PSV    Waveform                       PSV    Waveform  156 cm/s          Subclavian Proximal 119 cm/s                   Right Left  ICA/CCA Ratio  1.1  2.2    No past medical history on file.   Past Surgical History:   Procedure Laterality Date    OTHER SURGICAL HISTORY  04/06/2022    Coronary artery bypass graft    OTHER SURGICAL HISTORY  08/11/2022    Cataract surgery      Social History     Socioeconomic History    Marital status:      Spouse name: Not on file    Number of children: Not on file    Years of education: Not on file    Highest education level: Not on file   Occupational History    Not on file   Tobacco Use    Smoking status: Never    Smokeless tobacco: Not on file   Substance and Sexual Activity    Alcohol use: Not on file    Drug use: Not on file    Sexual activity: Not on file   Other Topics Concern    Not on file   Social History Narrative    Not on file     Social Drivers of Health     Financial Resource Strain: Not on file   Food Insecurity: Not on file   Transportation Needs: Not on file   Physical Activity: Not on file   Stress: Not on file   Social Connections: Not on file   Intimate Partner Violence: Not on file   Housing Stability: Not on file    No family history on file.   No Known Allergies   Current Outpatient Medications   Medication Instructions    ascorbic acid (Vitamin C) 1,000 mg tablet Take by mouth.    aspirin 81 mg EC tablet Daily RT    carvedilol (COREG) 25 mg, oral, 2 times daily (morning and late afternoon)    carvedilol (COREG) 12.5 mg, oral, 2 times daily (morning and late afternoon)    cholecalciferol (Vitamin D-3) 50 mcg (2,000 unit) capsule Take by mouth.    dapagliflozin propanediol (FARXIGA) 10 mg, oral, Daily before breakfast    ezetimibe (ZETIA) 10 mg, oral, Nightly    fish oil concentrate (Omega-3) 120-180 mg capsule Take by mouth.    losartan (COZAAR) 50 mg,  oral, Daily    naproxen sodium (Aleve) 220 mg tablet 1 tablet, 3 times daily PRN    rosuvastatin (CRESTOR) 40 mg, oral, Daily    spironolactone (ALDACTONE) 25 mg, oral, Daily            Review of Systems   Constitutional:  Negative for chills, fatigue and fever.   HENT:  Negative for congestion, sore throat and trouble swallowing.    Eyes:  Negative for visual disturbance.   Respiratory:  Negative for cough, shortness of breath and wheezing.    Cardiovascular:  Negative for chest pain, palpitations and leg swelling.   Gastrointestinal:  Negative for abdominal pain, constipation, diarrhea, nausea and vomiting.   Endocrine: Negative for cold intolerance and heat intolerance.   Genitourinary:  Negative for difficulty urinating.   Musculoskeletal:  Negative for arthralgias, joint swelling and neck pain.   Skin:  Negative for color change and wound.   Neurological:  Negative for dizziness, seizures, syncope, facial asymmetry, speech difficulty, weakness, light-headedness, numbness and headaches.   Hematological:  Negative for adenopathy.   Psychiatric/Behavioral:  Negative for behavioral problems, confusion and sleep disturbance.      Vitals:    11/20/24 1011   BP: 138/77   Pulse: 72   Weight: 111 kg (245 lb)      Objective   Physical Exam  Constitutional:       Appearance: Normal appearance.   HENT:      Head: Normocephalic.      Right Ear: External ear normal.      Left Ear: External ear normal.      Nose: Nose normal.      Mouth/Throat:      Mouth: Mucous membranes are moist.   Eyes:      Extraocular Movements: Extraocular movements intact.   Neck:      Vascular: Carotid bruit present.   Cardiovascular:      Rate and Rhythm: Normal rate and regular rhythm.      Pulses: Normal pulses.   Pulmonary:      Effort: Pulmonary effort is normal. No respiratory distress.      Breath sounds: Normal breath sounds.   Abdominal:      Palpations: Abdomen is soft.      Tenderness: There is no abdominal tenderness.   Musculoskeletal:          General: No swelling or tenderness.      Cervical back: Normal range of motion and neck supple.      Right lower leg: No edema.      Left lower leg: No edema.   Skin:     General: Skin is warm and dry.      Capillary Refill: Capillary refill takes less than 2 seconds.      Findings: No lesion.   Neurological:      General: No focal deficit present.      Mental Status: He is alert and oriented to person, place, and time. Mental status is at baseline.   Psychiatric:         Mood and Affect: Mood normal.         Behavior: Behavior normal.         Thought Content: Thought content normal.         Judgment: Judgment normal.         Assessment/Plan   Problem List Items Addressed This Visit    None  Visit Diagnoses         Codes    Left carotid artery stenosis    -  Primary I65.22    Relevant Orders    Vascular US Carotid Artery Duplex Bilateral    More than 50 percent stenosis of left internal carotid artery     I65.22        69 year old male with past medical history of CAD s/p CABG 2013, HTN, HLD, CHF LVEF 45%, obesity, presents to the office as a new patient for evaluation of left carotid stenosis. Referred by his cardiologist Dr Dash. Left carotid bruit heard on examination at Cardiology office, carotid duplex with 50-69% left carotid stenosis. Asymptomatic  -Discussed carotid artery disease and indications for surveillance vs intervention  -Reviewed and Discussed results of carotid ultrasound, 50-69% left ICA stenosis, asymptomatic  -Repeat carotid duplex 1 year  -continue [id number] and lipid lowering medication  -Reviewed signs and symptoms of TIA/CVA and when to seek urgent medical attention  -Discussed above course of care and treatment plan with patient who is in agreement, all questions answered  -Patient to call with new or worsening symptoms. Patient to call with questions or concerns   -Follow up 1 year         Olesya Ceja PA-C 11/20/24 10:34 AM

## 2024-11-26 NOTE — PROGRESS NOTES
Pharmacist Clinic: Cardiology Management    Wing Stone is a 69 y.o. male was referred to Clinical Pharmacy Team for heart failure/cardiomyopathy management.     Referring Provider: Shailesh Dash, DO    THIS IS A NEW PATIENT APPOINTMENT. PATIENT WILL BE ESTABLISHING CARE WITH CLINICAL PHARMACY.    Appointment was completed by the patient and his wife who was reached at .    REVIEW OF PAST APPNT (IF APPLICABLE):   N/A    Allergies Reviewed? Yes    Allergies   Allergen Reactions    Relafen [Nabumetone] Itching       No past medical history on file.    Current Outpatient Medications on File Prior to Visit   Medication Sig Dispense Refill    ascorbic acid (Vitamin C) 1,000 mg tablet Take 1 tablet (1,000 mg) by mouth once daily.      aspirin 81 mg EC tablet Take by mouth once daily.      carvedilol (Coreg) 12.5 mg tablet TAKE 1 TABLET BY MOUTH TWICE DAILY WITH MEALS 180 tablet 3    cholecalciferol (Vitamin D-3) 50 mcg (2,000 unit) capsule Take 1 capsule (50 mcg) by mouth once daily.      dapagliflozin propanediol (Farxiga) 10 mg Take 1 tablet (10 mg) by mouth once daily in the morning. Take before meals. 90 tablet 1    ezetimibe (Zetia) 10 mg tablet TAKE 1 TABLET BY MOUTH AT BEDTIME 90 tablet 3    fish oil concentrate (Omega-3) 120-180 mg capsule Take 1 capsule (1 g) by mouth once daily.      losartan (Cozaar) 50 mg tablet Take 1 tablet (50 mg) by mouth once daily. 90 tablet 1    naproxen sodium (Aleve) 220 mg tablet Take 1 tablet (220 mg) by mouth once daily as needed.      rosuvastatin (Crestor) 40 mg tablet Take 1 tablet (40 mg) by mouth once daily. 90 tablet 3    spironolactone (Aldactone) 25 mg tablet Take 1 tablet by mouth once daily 90 tablet 1     No current facility-administered medications on file prior to visit.         RELEVANT LAB RESULTS:  Lab Results   Component Value Date    BILITOT 0.7 03/16/2024    CALCIUM 8.8 09/14/2024    CO2 26 09/14/2024     09/14/2024    CREATININE 0.95  "2024    GLUCOSE 117 (H) 2024    ALKPHOS 63 2024    K 4.4 2024    PROT 7.0 2024     2024    AST 23 2024    ALT 31 2024    BUN 9 2024    ANIONGAP 10 2024    MG 2.00 2024    ALBUMIN 4.2 2024    GFRMALE 74 2023     Lab Results   Component Value Date    TRIG 89 2024    CHOL 99 2024    LDLCALC 48 2024    HDL 33.3 2024     No results found for: \"BMCBC\", \"CBCDIF\"     PHARMACEUTICAL ASSESSMENT:    MEDICATION RECONCILIATION    Home Pharmacy Reviewed? Yes, describe: [id number], [place]    Added:  - None    Changed:  - Vitamin C 1000 m tablet daily  - Vitamin D3 2000 units: 1 capsule daily  - Naproxen 220 mg: patient only taking once daily PRN  -Fish Oil: 1 capsule by daily    Removed:  - None    Drug Interactions? No clinically significant drug interactions requiring change in therapy found at the time of this visit.     Medication Documentation Review Audit       Reviewed by Kayden HammD (Pharmacist) on 24 at 0905      Medication Order Taking? Sig Documenting Provider Last Dose Status   ascorbic acid (Vitamin C) 1,000 mg tablet [id number] Yes Take 1 tablet (1,000 mg) by mouth once daily. Historical Provider, MD  Active   aspirin 81 mg EC tablet [id number] Yes Take by mouth once daily. Historical Provider, MD  Active   carvedilol (Coreg) 12.5 mg tablet [id number] Yes TAKE 1 TABLET BY MOUTH TWICE DAILY WITH MEALS ERIN Shook-CNP  Active   cholecalciferol (Vitamin D-3) 50 mcg (2,000 unit) capsule [id number] Yes Take 1 capsule (50 mcg) by mouth once daily. Historical Provider, MD  Active   dapagliflozin propanediol (Farxiga) 10 mg [id number] Yes Take 1 tablet (10 mg) by mouth once daily in the morning. Take before meals. ERIN Boland-CNP  Active   ezetimibe (Zetia) 10 mg tablet [id number] Yes TAKE 1 TABLET BY MOUTH AT BEDTIME Riky Garcia PA-C  Active   fish oil concentrate " (Omega-3) 120-180 mg capsule [id number] Yes Take 1 capsule (1 g) by mouth once daily. Historical Provider, MD  Active   losartan (Cozaar) 50 mg tablet [id number] Yes Take 1 tablet (50 mg) by mouth once daily. ERIN Boland-CNP  Active   naproxen sodium (Aleve) 220 mg tablet [id number] Yes Take 1 tablet (220 mg) by mouth once daily as needed. Historical Provider, MD  Active   rosuvastatin (Crestor) 40 mg tablet [id number] Yes Take 1 tablet (40 mg) by mouth once daily. MANJULA Shook  Active   spironolactone (Aldactone) 25 mg tablet [id number] Yes Take 1 tablet by mouth once daily MANJULA Shook  Active                    DISEASE MANAGEMENT ASSESSMENT:     CHF ASSESSMENT     Symptom/Staging:  -Most recent ejection fraction: 45%  -NYHA Stage: Unknown    Results for orders placed in visit on 02/16/23    [place] [address]  Phone [phone] Fax [phone]    TRANSTHORACIC ECHOCARDIOGRAM REPORT      Patient Name:     JACK ELENA Oleary Physician:  [id number] Jaleel Bowmna MD  Study Date:       2/16/2023      Referring           JUAN DAVID AMBROSIO  Physician:  MRN/PID:          [id number]       PCP:  Accession/Order#: [id number]   [facility] Echo Lab  Location:  YOB: 1955      Fellow:  Gender:           M              Nurse:              Delaney Patel RN  Admit Date:                      Sonographer:        Bharati [facility]  Admission Status: Outpatient     Additional Staff:  Height:           175.26 cm      CC Report to:  Weight:           108.86 kg      Study Type:         Echocardiogram  BSA:              2.23 m2  Blood Pressure: 118 /70 mmHg    Diagnosis/ICD: R94.31-Abnormal electrocardiogram [ECG] [EKG];  I25.10-Atherosclerotic heart disease of native coronary artery  without angina pectoris; I50.20-Unspecified systolic (congestive)  heart failure (CHF)  Indication:     Congestive Heart Failure, Abnormal EKG  Procedure/CPT: Echo Complete w Full Doppler-[id number]    Patient History:  Pertinent History: CAD, CABG (2013), HTN.    Study Detail: The following Echo studies were performed: 2D, M-Mode, Doppler and  color flow. Technically challenging study due to poor acoustic  windows. Optison used as a contrast agent for endocardial border  definition. Total contrast used for this procedure was 3 mL via IV  push.      PHYSICIAN INTERPRETATION:  Left Ventricle: Left ventricular systolic function is mildly decreased, with an estimated ejection fraction of 45%. Wall motion is abnormal. The left ventricular cavity size is normal. Left ventricular diastolic filling was not assessed. Apical and anterolateral segments appear hypokinetic.  Left Atrium: The left atrium is normal in size.  Right Ventricle: The right ventricle is normal in size. There is mildly reduced right ventricular systolic function.  Right Atrium: The right atrium is normal in size.  Aortic Valve: The aortic valve is trileaflet. There is no evidence of aortic valve regurgitation. The peak instantaneous gradient of the aortic valve is 7.4 mmHg. The mean gradient of the aortic valve is 4.1 mmHg.  Mitral Valve: The mitral valve is normal in structure. There is trace mitral valve regurgitation.  Tricuspid Valve: The tricuspid valve is structurally normal. No evidence of tricuspid regurgitation.  Pulmonic Valve: The pulmonic valve is structurally normal. There is physiologic pulmonic valve regurgitation.  Pericardium: There is no pericardial effusion noted.  Aorta: The aortic root is normal.      CONCLUSIONS:  1. Left ventricular systolic function is mildly decreased with a 45% estimated ejection fraction.  2. Apical and anterolateral segments appear hypokinetic.  3. There is mildly reduced right ventricular systolic function.    QUANTITATIVE DATA SUMMARY:  2D MEASUREMENTS:  Normal Ranges:  LAs:           4.15 cm    (2.7-4.0cm)  IVSd:           1.04 cm    (0.6-1.1cm)  LVPWd:         1.00 cm    (0.6-1.1cm)  LVIDd:         6.26 cm    (3.9-5.9cm)  LVIDs:         5.26 cm  LV Mass Index: 121.8 g/m2  LV % FS        16.0 %    LA VOLUME:  Normal Ranges:  LA Vol A4C:        58.9 ml    (22+/-6mL/m2)  LA Vol A2C:        53.2 ml  LA Vol BP:         56.4 ml  LA Vol Index A4C:  26.4 ml/m2  LA Vol Index A2C:  23.8 ml/m2  LA Vol Index BP:   25.3 ml/m2  LA Area A4C:       20.9 cm2  LA Area A2C:       19.7 cm2  LA Major Axis A4C: 6.3 cm  LA Major Axis A2C: 6.2 cm  LA Volume Index:   25.3 ml/m2  LA Vol A4C:        55.7 ml  LA Vol A2C:        50.0 ml    RA VOLUME BY A/L METHOD:  Normal Ranges:  RA Area A4C: 18.5 cm2    AORTA MEASUREMENTS:  Normal Ranges:  Ao Sinus, d: 3.60 cm (2.1-3.5cm)  Ao STJ, d:   2.90 cm (1.7-3.4cm)  Asc Ao, d:   3.10 cm (2.1-3.4cm)    LV SYSTOLIC FUNCTION BY 2D PLANIMETRY (MOD):  Normal Ranges:  EF-A4C View: 45.1 % (>=55%)  EF-A2C View: 38.9 %  EF-Biplane:  44.1 %    LV DIASTOLIC FUNCTION:  Normal Ranges:  MV Peak E:        1.09 m/s    (0.7-1.2 m/s)  MV Peak A:        0.88 m/s    (0.42-0.7 m/s)  E/A Ratio:        1.24        (1.0-2.2)  MV A Dur:         121.79 msec  PulmV A Revs Dur: 163.65 msec    MITRAL VALVE:  Normal Ranges:  MV DT: 241 msec (150-240msec)    MITRAL INSUFFICIENCY:  Normal Ranges:  PISA Radius:  0.5 cm  MR VTI:       167.73 cm  MR Vmax:      483.82 cm/s  MR Alias Yasmany: 39.3 cm/s  MR Volume:    24.08 ml  MR Flow Rt:   69.47 ml/s  MR EROA:      0.14 cm2  dP/dt:        855 mmHg/s  (>1200mmHg/sec)    AORTIC VALVE:  Normal Ranges:  AoV Vmax:                1.36 m/s (<=1.7m/s)  AoV Peak P.4 mmHg (<20mmHg)  AoV Mean P.1 mmHg (1.7-11.5mmHg)  LVOT Max Yasmany:            1.01 m/s (<=1.1m/s)  AoV VTI:                 32.26 cm (18-25cm)  LVOT VTI:                23.79 cm  LVOT Diameter:           2.24 cm  (1.8-2.4cm)  AoV Area, VTI:           2.90 cm2 (2.5-5.5cm2)  AoV Area,Vmax:           2.91 cm2  (2.5-4.5cm2)  AoV Dimensionless Index: 0.74    RIGHT VENTRICLE:  RV 1   3.4 cm  RV 2   2.5 cm  RV 3   7.7 cm  TAPSE: 19.6 mm  RV s'  0.11 m/s    TRICUSPID VALVE/RVSP:  Normal Ranges:  Peak TR Velocity: 2.24 m/s  RV Syst Pressure: 23.1 mmHg (< 30mmHg)  TV E Vmax:        0.46 m/s  (0.3-0.7m/s)  TV A Vmax:        0.45 m/s  IVC Diam:         1.60 cm  TV e'             0.1 m/s    Pulmonary Veins:  PulmV A Revs Dur: 163.65 msec    AORTA:  Asc Ao Diam 3.15 cm      [id number] Jaleel Bowman MD  Electronically signed on 2/16/2023 at 4:53:15 PM         Final       Guideline-Directed Medical Therapy:  -ARNI: No   -If no, then ACEi/ARB?: Yes, describe: Losartan 50 mg every day   -Beta Blocker: Yes, describe: Carvedilol 12.5 mg BID  -MRA: Yes, describe: Spironolactone 25 mg every day   -SGLT2i: Yes, describe: Farxiga 10 mg every day     Secondary Prevention:  -The ASCVD Risk score (Emre DK, et al., 2019) failed to calculate for the following reasons:    The valid total cholesterol range is 130 to 320 mg/dL   -Aspirin 81mg? yes  -Statin?: Yes, describe: Rosuvastatin 40 mg every day   -HTN?: Yes, describe: 138/77 as of 11/20/2024    CURRENT PHARMACOTHERAPY:   Carvedilol 12.5 mg BID  Farxiga 10 mg every day   Losartan 50 mg every day   Spironolactone 25 mg every day     Affordability: Farxiga is cost prohibitive through insurance alone  Adherence/Compliance: No concerns; patient uses a pill box for organization. Will miss a dose about 1-2 times per month but nothing consistent.   Adverse Effects: None    Monitoring Weights at Home: Yes; only about once every 2-3 weeks  Home Weight Recordings: 245 lbs (no acute fluctuations)     Past In Office Weight Readings:   Wt Readings from Last 6 Encounters:   11/20/24 111 kg (245 lb)   09/25/24 111 kg (244 lb 3.2 oz)   03/22/24 112 kg (246 lb 12.8 oz)   09/22/23 111 kg (245 lb 6 oz)   02/23/23 113 kg (249 lb)   08/11/22 116 kg (255 lb 2 oz)       Monitoring Blood Pressure at Home: Yes; only  checks once in awhile   Home BP Recordings: ~120/76    Past In Office BP Readings:   BP Readings from Last 6 Encounters:   11/20/24 138/77   09/25/24 132/78   03/22/24 144/74   09/22/23 148/74   02/23/23 130/82   08/11/22 118/70       HEALTH MANAGEMENT    Maintaining fluid restriction (<2 L/day): N/A  Edema/swelling: No  Shortness of breath: Yes; only with strenuous exertion   Trouble sleeping/lying down: No  Dry/hacking cough: No  Recent Hospitalizations: No    EDUCATION/COUNSELING:   - Counseled patient on MOA, expectations, duration of therapy, contraindications, administration, and monitoring parameters  - Counseled patient on lifestyle modifications that can decrease your risk of having complications (smoking cessation, losing weight, daily weights, vaccines)  - Counseled patient on fluid intake and weight management. Recommended to not consume more than 2 liters of fliuids per day. If they have gained more than 2-3 pounds within a 24 hours period (or 5 pounds in a week), contact their cardiologist  - Answered all patient questions and concerns       DISCUSSION/NOTES:   Today's appointment was initial visit to establish care with Clinical Pharmacy. Wing is doing well, reports no sign/symptoms of worsening heart failure at this time. No adverse effects from pharmacotherapy reported.  He does state cost burden with Farxiga. Will apply for  PAP for cost assistance and follow up in 1 month.     Patient Assistance Program (PAP)    Application for program to be submitted for the following medications: [facility] Permanent Address: [facility]   Prescription Insurance:   Yes   Members of Household: 2   Files Taxes: No; patient does not file taxes ([place] only) but spouse does        Patient will be email financial information to pharmacist directly at [email or web address].    Patient verbally reports monthly or yearly income which is less than 400% federal poverty level    Patient aware this  process may take up to 6 weeks.     If approved medication must be filled through [facility] and MEDICATION WILL BE MAILED TO PATIENT.       ASSESSMENT:    Assessment/Plan   Problem List Items Addressed This Visit       Heart failure with mid-range ejection fraction (HFmEF)     Wing is currently taking 4/4 GDMT therapies. No signs/symptoms of worsening heart failure reported at today's visit. Will apply for [facility] for cost assistance and follow up in 1 month.         Relevant Orders    Referral to Clinical Pharmacy         RECOMMENDATIONS/PLAN:    Continue:  Carvedilol 12.5 mg BID  Farxiga 10 mg every day   Losartan 50 mg every day   Spironolactone 25 mg every day   Follow up: 1 month    Last Appnt with Referring Provider: 9/25/2024  Next Appnt with Referring Provider: 3/31/2025  Clinical Pharmacist follow up: 12/31/2024  VAF/Application Expiration: Yes    Date: Pending   Type of Encounter: Kayden JenningsD    Verbal consent to manage patient's drug therapy was obtained from the patient . They were informed they may decline to participate or withdraw from participation in pharmacy services at any time.    Continue all meds under the continuation of care with the referring provider and clinical pharmacy team.

## 2024-11-27 ENCOUNTER — APPOINTMENT (OUTPATIENT)
Dept: PHARMACY | Facility: HOSPITAL | Age: 69
End: 2024-11-27
Payer: MEDICARE

## 2024-11-27 DIAGNOSIS — I50.20 HFREF (HEART FAILURE WITH REDUCED EJECTION FRACTION): ICD-10-CM

## 2024-11-27 DIAGNOSIS — I50.22 HEART FAILURE WITH MID-RANGE EJECTION FRACTION (HFMEF): ICD-10-CM

## 2024-11-27 RX ORDER — DAPAGLIFLOZIN 10 MG/1
10 TABLET, FILM COATED ORAL
Qty: 90 TABLET | Refills: 3 | Status: SHIPPED | OUTPATIENT
Start: 2024-11-27

## 2024-11-27 NOTE — Clinical Note
Marija Dash, Today's appointment was initial visit to establish care with Clinical Pharmacy. Wing is doing well, reports no sign/symptoms of worsening heart failure at this time. No adverse effects from pharmacotherapy reported. Will apply for  [employer] for cost assistance with Farxiga and follow up in 1 month.

## 2024-11-27 NOTE — ASSESSMENT & PLAN NOTE
Wing is currently taking 4/4 GDMT therapies. No signs/symptoms of worsening heart failure reported at today's visit. Will apply for  PAP for cost assistance and follow up in 1 month.

## 2024-12-02 ENCOUNTER — TELEPHONE (OUTPATIENT)
Dept: PHARMACY | Facility: HOSPITAL | Age: 69
End: 2024-12-02
Payer: MEDICARE

## 2024-12-02 PROCEDURE — RXMED WILLOW AMBULATORY MEDICATION CHARGE

## 2024-12-02 NOTE — TELEPHONE ENCOUNTER
Patient Assistance Program Approval:     We are pleased to inform you that your application for assistance has been approved.     This approval is valid through  12/2/2025  as long as the following criteria continue to be satisfied:     Your medication (Farxiga) remains covered under your current insurance plan.   Your prescriber does not discontinue therapy.   You do not seek reimbursement from any other private or government-funded programs for the  medication.    Under this program, the pharmacy will first bill your insurance plan for your indemnified specified medication. The [employer] Assistance Fund will then offset your copay balance, so that your out-of pocket expense for your specialty medication will be $0.00.    Left voicemail for patient with the above information.    Jannet Herrera, KaydenD

## 2024-12-05 ENCOUNTER — PHARMACY VISIT (OUTPATIENT)
Dept: PHARMACY | Facility: CLINIC | Age: 69
End: 2024-12-05
Payer: MEDICARE

## 2024-12-30 NOTE — PROGRESS NOTES
Pharmacist Clinic: Cardiology Management    Wing Stone is a 69 y.o. male was referred to Clinical Pharmacy Team for heart failure/cardiomyopathy management.     Referring Provider: Shailesh Dash, DO    THIS IS A FOLLOW UP PATIENT APPOINTMENT. AT LAST VISIT ON 11/27/2024 WITH PHARMACIST (Jannet Herrera).    Appointment was completed by the patient and his wife who was reached at .    REVIEW OF PAST APPNT (IF APPLICABLE):   Today's appointment was initial visit to establish care with Clinical Pharmacy. Jack is doing well, reports no sign/symptoms of worsening heart failure at this time. No adverse effects from pharmacotherapy reported.  PAP approved through 12/2/2025.    Allergies   Allergen Reactions    Relafen [Nabumetone] Itching       No past medical history on file.    Current Outpatient Medications on File Prior to Visit   Medication Sig Dispense Refill    ascorbic acid (Vitamin C) 1,000 mg tablet Take 1 tablet (1,000 mg) by mouth once daily.      aspirin 81 mg EC tablet Take by mouth once daily.      carvedilol (Coreg) 12.5 mg tablet TAKE 1 TABLET BY MOUTH TWICE DAILY WITH MEALS 180 tablet 3    cholecalciferol (Vitamin D-3) 50 mcg (2,000 unit) capsule Take 1 capsule (50 mcg) by mouth once daily.      dapagliflozin propanediol (Farxiga) 10 mg Take 1 tablet (10 mg) by mouth once daily in the morning. Take before meals. 90 tablet 3    ezetimibe (Zetia) 10 mg tablet TAKE 1 TABLET BY MOUTH AT BEDTIME 90 tablet 3    fish oil concentrate (Omega-3) 120-180 mg capsule Take 1 capsule (1 g) by mouth once daily.      losartan (Cozaar) 50 mg tablet Take 1 tablet (50 mg) by mouth once daily. 90 tablet 1    naproxen sodium (Aleve) 220 mg tablet Take 1 tablet (220 mg) by mouth once daily as needed.      rosuvastatin (Crestor) 40 mg tablet Take 1 tablet (40 mg) by mouth once daily. 90 tablet 3    spironolactone (Aldactone) 25 mg tablet Take 1 tablet by mouth once daily 90 tablet 1     No current  "facility-administered medications on file prior to visit.         RELEVANT LAB RESULTS:  Lab Results   Component Value Date    BILITOT 0.7 03/16/2024    CALCIUM 8.8 09/14/2024    CO2 26 09/14/2024     09/14/2024    CREATININE 0.95 09/14/2024    GLUCOSE 117 (H) 09/14/2024    ALKPHOS 63 03/16/2024    K 4.4 09/14/2024    PROT 7.0 03/16/2024     09/14/2024    AST 23 03/16/2024    ALT 31 03/16/2024    BUN 9 09/14/2024    ANIONGAP 10 09/14/2024    MG 2.00 09/14/2024    ALBUMIN 4.2 03/16/2024    GFRMALE 74 08/21/2023     Lab Results   Component Value Date    TRIG 89 03/16/2024    CHOL 99 03/16/2024    LDLCALC 48 03/16/2024    HDL 33.3 03/16/2024     No results found for: \"BMCBC\", \"CBCDIF\"     PHARMACEUTICAL ASSESSMENT:    MEDICATION RECONCILIATION    Home Pharmacy Reviewed? Yes, describe: [id number], [facility]    Drug Interactions? No clinically significant drug interactions requiring change in therapy found at the time of this visit.     Medication Documentation Review Audit       Reviewed by Jannet Herrera, PharmD (Pharmacist) on 11/27/24 at 0905      Medication Order Taking? Sig Documenting Provider Last Dose Status   ascorbic acid (Vitamin C) 1,000 mg tablet [id number] Yes Take 1 tablet (1,000 mg) by mouth once daily. Historical Provider, MD  Active   aspirin 81 mg EC tablet [id number] Yes Take by mouth once daily. Historical Provider, MD  Active   carvedilol (Coreg) 12.5 mg tablet [id number] Yes TAKE 1 TABLET BY MOUTH TWICE DAILY WITH MEALS ERIN Shook-CNP  Active   cholecalciferol (Vitamin D-3) 50 mcg (2,000 unit) capsule [id number] Yes Take 1 capsule (50 mcg) by mouth once daily. Historical Provider, MD  Active   dapagliflozin propanediol (Farxiga) 10 mg [id number] Yes Take 1 tablet (10 mg) by mouth once daily in the morning. Take before meals. ERIN Boland-CNP  Active   ezetimibe (Zetia) 10 mg tablet [id number] Yes TAKE 1 TABLET BY MOUTH AT BEDTIME Riky Garcia PA-C  Active "   fish oil concentrate (Omega-3) 120-180 mg capsule [id number] Yes Take 1 capsule (1 g) by mouth once daily. Historical Provider, MD  Active   losartan (Cozaar) 50 mg tablet [id number] Yes Take 1 tablet (50 mg) by mouth once daily. ERIN Boland-CNP  Active   naproxen sodium (Aleve) 220 mg tablet [id number] Yes Take 1 tablet (220 mg) by mouth once daily as needed. Historical Provider, MD  Active   rosuvastatin (Crestor) 40 mg tablet [id number] Yes Take 1 tablet (40 mg) by mouth once daily. MANJULA Shook  Active   spironolactone (Aldactone) 25 mg tablet [id number] Yes Take 1 tablet by mouth once daily MANJULA Shook  Active                    DISEASE MANAGEMENT ASSESSMENT:     CHF ASSESSMENT     Symptom/Staging:  -Most recent ejection fraction: 45%  -NYHA Stage: Unknown    Results for orders placed in visit on 02/16/23    [place] [address]  Phone [phone] Fax [phone]    TRANSTHORACIC ECHOCARDIOGRAM REPORT      Patient Name:     DEBBIE Oleary Physician:  [id number] Jaleel Bowman MD  Study Date:       2/16/2023      Referring           JUAN DAVID AMBROSIO  Physician:  MRN/PID:          [id number]       PCP:  Accession/Order#: [id number]   [facility] Echo Lab  Location:  YOB: 1955      Fellow:  Gender:           M              Nurse:              Delaney Patel RN  Admit Date:                      Sonographer:        [facility]  Admission Status: Outpatient     Additional Staff:  Height:           175.26 cm      CC Report to:  Weight:           108.86 kg      Study Type:         Echocardiogram  BSA:              2.23 m2  Blood Pressure: 118 /70 mmHg    Diagnosis/ICD: R94.31-Abnormal electrocardiogram [ECG] [EKG];  I25.10-Atherosclerotic heart disease of native coronary artery  without angina pectoris; I50.20-Unspecified systolic (congestive)  heart failure  (CHF)  Indication:    Congestive Heart Failure, Abnormal EKG  Procedure/CPT: Echo Complete w Full [id number]    Patient History:  Pertinent History: CAD, CABG (2013), HTN.    Study Detail: The following Echo studies were performed: 2D, M-Mode, Doppler and  color flow. Technically challenging study due to poor acoustic  windows. Optison used as a contrast agent for endocardial border  definition. Total contrast used for this procedure was 3 mL via IV  push.      PHYSICIAN INTERPRETATION:  Left Ventricle: Left ventricular systolic function is mildly decreased, with an estimated ejection fraction of 45%. Wall motion is abnormal. The left ventricular cavity size is normal. Left ventricular diastolic filling was not assessed. Apical and anterolateral segments appear hypokinetic.  Left Atrium: The left atrium is normal in size.  Right Ventricle: The right ventricle is normal in size. There is mildly reduced right ventricular systolic function.  Right Atrium: The right atrium is normal in size.  Aortic Valve: The aortic valve is trileaflet. There is no evidence of aortic valve regurgitation. The peak instantaneous gradient of the aortic valve is 7.4 mmHg. The mean gradient of the aortic valve is 4.1 mmHg.  Mitral Valve: The mitral valve is normal in structure. There is trace mitral valve regurgitation.  Tricuspid Valve: The tricuspid valve is structurally normal. No evidence of tricuspid regurgitation.  Pulmonic Valve: The pulmonic valve is structurally normal. There is physiologic pulmonic valve regurgitation.  Pericardium: There is no pericardial effusion noted.  Aorta: The aortic root is normal.      CONCLUSIONS:  1. Left ventricular systolic function is mildly decreased with a 45% estimated ejection fraction.  2. Apical and anterolateral segments appear hypokinetic.  3. There is mildly reduced right ventricular systolic function.    QUANTITATIVE DATA SUMMARY:  2D MEASUREMENTS:  Normal Ranges:  LAs:           4.15 cm     (2.7-4.0cm)  IVSd:          1.04 cm    (0.6-1.1cm)  LVPWd:         1.00 cm    (0.6-1.1cm)  LVIDd:         6.26 cm    (3.9-5.9cm)  LVIDs:         5.26 cm  LV Mass Index: 121.8 g/m2  LV % FS        16.0 %    LA VOLUME:  Normal Ranges:  LA Vol A4C:        58.9 ml    (22+/-6mL/m2)  LA Vol A2C:        53.2 ml  LA Vol BP:         56.4 ml  LA Vol Index A4C:  26.4 ml/m2  LA Vol Index A2C:  23.8 ml/m2  LA Vol Index BP:   25.3 ml/m2  LA Area A4C:       20.9 cm2  LA Area A2C:       19.7 cm2  LA Major Axis A4C: 6.3 cm  LA Major Axis A2C: 6.2 cm  LA Volume Index:   25.3 ml/m2  LA Vol A4C:        55.7 ml  LA Vol A2C:        50.0 ml    RA VOLUME BY A/L METHOD:  Normal Ranges:  RA Area A4C: 18.5 cm2    AORTA MEASUREMENTS:  Normal Ranges:  Ao Sinus, d: 3.60 cm (2.1-3.5cm)  Ao STJ, d:   2.90 cm (1.7-3.4cm)  Asc Ao, d:   3.10 cm (2.1-3.4cm)    LV SYSTOLIC FUNCTION BY 2D PLANIMETRY (MOD):  Normal Ranges:  EF-A4C View: 45.1 % (>=55%)  EF-A2C View: 38.9 %  EF-Biplane:  44.1 %    LV DIASTOLIC FUNCTION:  Normal Ranges:  MV Peak E:        1.09 m/s    (0.7-1.2 m/s)  MV Peak A:        0.88 m/s    (0.42-0.7 m/s)  E/A Ratio:        1.24        (1.0-2.2)  MV A Dur:         121.79 msec  PulmV A Revs Dur: 163.65 msec    MITRAL VALVE:  Normal Ranges:  MV DT: 241 msec (150-240msec)    MITRAL INSUFFICIENCY:  Normal Ranges:  PISA Radius:  0.5 cm  MR VTI:       167.73 cm  MR Vmax:      483.82 cm/s  MR Alias Yasmany: 39.3 cm/s  MR Volume:    24.08 ml  MR Flow Rt:   69.47 ml/s  MR EROA:      0.14 cm2  dP/dt:        855 mmHg/s  (>1200mmHg/sec)    AORTIC VALVE:  Normal Ranges:  AoV Vmax:                1.36 m/s (<=1.7m/s)  AoV Peak P.4 mmHg (<20mmHg)  AoV Mean P.1 mmHg (1.7-11.5mmHg)  LVOT Max Yasmany:            1.01 m/s (<=1.1m/s)  AoV VTI:                 32.26 cm (18-25cm)  LVOT VTI:                23.79 cm  LVOT Diameter:           2.24 cm  (1.8-2.4cm)  AoV Area, VTI:           2.90 cm2 (2.5-5.5cm2)  AoV Area,Vmax:            2.91 cm2 (2.5-4.5cm2)  AoV Dimensionless Index: 0.74    RIGHT VENTRICLE:  RV 1   3.4 cm  RV 2   2.5 cm  RV 3   7.7 cm  TAPSE: 19.6 mm  RV s'  0.11 m/s    TRICUSPID VALVE/RVSP:  Normal Ranges:  Peak TR Velocity: 2.24 m/s  RV Syst Pressure: 23.1 mmHg (< 30mmHg)  TV E Vmax:        0.46 m/s  (0.3-0.7m/s)  TV A Vmax:        0.45 m/s  IVC Diam:         1.60 cm  TV e'             0.1 m/s    Pulmonary Veins:  PulmV A Revs Dur: 163.65 msec    AORTA:  Asc Ao Diam 3.15 cm      [id number] Jaleel Bowman MD  Electronically signed on 2/16/2023 at 4:53:15 PM         Final       Guideline-Directed Medical Therapy:  -ARNI: No   -If no, then ACEi/ARB?: Yes, describe: Losartan 50 mg every day   -Beta Blocker: Yes, describe: Carvedilol 12.5 mg BID  -MRA: Yes, describe: Spironolactone 25 mg every day   -SGLT2i: Yes, describe: Farxiga 10 mg every day     Secondary Prevention:  -The ASCVD Risk score (Emre DK, et al., 2019) failed to calculate for the following reasons:    The valid total cholesterol range is 130 to 320 mg/dL   -Aspirin 81mg? yes  -Statin?: Yes, describe: Rosuvastatin 40 mg every day   -HTN?: Yes, describe: 138/77 as of 11/20/2024    CURRENT PHARMACOTHERAPY:   Carvedilol 12.5 mg BID  Farxiga 10 mg every day   Losartan 50 mg every day   Spironolactone 25 mg every day     Affordability:  PAP active through 12/2/2025  Adherence/Compliance: No concerns; patient uses a pill box for organization. No recent missed doses per patient's wife  Adverse Effects: None    Monitoring Weights at Home: Yes; only about once every 2-3 weeks  Home Weight Recordings: ~224 lbs (has lost weight recently per spouse, no acute gain per spouse)     Past In Office Weight Readings:   Wt Readings from Last 6 Encounters:   11/20/24 111 kg (245 lb)   09/25/24 111 kg (244 lb 3.2 oz)   03/22/24 112 kg (246 lb 12.8 oz)   09/22/23 111 kg (245 lb 6 oz)   02/23/23 113 kg (249 lb)   08/11/22 116 kg (255 lb 2 oz)       Monitoring Blood Pressure at Home: Yes,  about once per week  Home BP Recordings: ~120/76    Past In Office BP Readings:   BP Readings from Last 6 Encounters:   11/20/24 138/77   09/25/24 132/78   03/22/24 144/74   09/22/23 148/74   02/23/23 130/82   08/11/22 118/70       HEALTH MANAGEMENT    Maintaining fluid restriction (<2 L/day): N/A  Edema/swelling: No  Shortness of breath: Yes; however patient is currently sick (acute cold)  Trouble sleeping/lying down: No  Dry/hacking cough: No  Recent Hospitalizations: No    EDUCATION/COUNSELING:   - Counseled patient on MOA, expectations, duration of therapy, contraindications, administration, and monitoring parameters  - Counseled patient on lifestyle modifications that can decrease your risk of having complications (smoking cessation, losing weight, daily weights, vaccines)  - Counseled patient on fluid intake and weight management. Recommended to not consume more than 2 liters of fliuids per day. If they have gained more than 2-3 pounds within a 24 hours period (or 5 pounds in a week), contact their cardiologist  - Answered all patient questions and concerns       DISCUSSION/NOTES:   Today's appointment was 1 month follow up regarding heart failure pharmacotherapy. Wing is doing well, has some shortness of breath but wife reports he currently has a cold. No adverse effects from pharmacotherapy or symptoms of worsening heart failure at this time.  Will have patient stop losartan and start Entresto for GDMT optimization. Wife reports he has taken Entresto in the past and has tolerated well. They are aware he will stop the losartan when starting Entresto and not to take the two together. Will also have him obtain safety labs 1-2 weeks after starting Entresto.  Will follow up in 1 month.       ASSESSMENT:    Assessment/Plan   Problem List Items Addressed This Visit       Heart failure with mid-range ejection fraction (HFmEF)     Patient to stop losartan and start Entresto for GDMT optimization. Will have safety  labs drawn 1-2 weeks after initiation. Continue other HF pharmacotherapy unchanged. Will follow up in 1 month.         Relevant Orders    Referral to Clinical Pharmacy     RECOMMENDATIONS/PLAN:    Start: Entresto 24-26 mg BID  Stop: Losartan 50 mg every day   Continue:  Carvedilol 12.5 mg BID  Farxiga 10 mg every day   Spironolactone 25 mg every day   Follow up: 1 month    Last Appnt with Referring Provider: 9/25/2024  Next Appnt with Referring Provider: 3/31/2025  Clinical Pharmacist follow up: 1/31/2025  VAF/Application Expiration: Yes    Date: 12/2/2025   Type of Encounter: Ezra Herrera PharmD    Verbal consent to manage patient's drug therapy was obtained from the patient . They were informed they may decline to participate or withdraw from participation in pharmacy services at any time.    Continue all meds under the continuation of care with the referring provider and clinical pharmacy team.

## 2024-12-31 ENCOUNTER — APPOINTMENT (OUTPATIENT)
Dept: PHARMACY | Facility: HOSPITAL | Age: 69
End: 2024-12-31
Payer: MEDICARE

## 2024-12-31 DIAGNOSIS — I50.22 HEART FAILURE WITH MID-RANGE EJECTION FRACTION (HFMEF): ICD-10-CM

## 2024-12-31 PROCEDURE — RXMED WILLOW AMBULATORY MEDICATION CHARGE

## 2024-12-31 RX ORDER — SACUBITRIL AND VALSARTAN 24; 26 MG/1; MG/1
1 TABLET, FILM COATED ORAL 2 TIMES DAILY
Qty: 180 TABLET | Refills: 3 | Status: SHIPPED | OUTPATIENT
Start: 2024-12-31

## 2024-12-31 NOTE — Clinical Note
Marija Dash, Today's appointment was 1 month follow up regarding heart failure pharmacotherapy. Wing is doing well, has some shortness of breath but wife reports he currently has a cold. No adverse effects from pharmacotherapy or symptoms of worsening heart failure at this time. Will have patient stop losartan and start Entresto for GDMT optimization. Wife reports he has taken Entresto in the past and has tolerated well. They are aware he will stop the losartan when starting Entresto and not to take the two together. Will also have him obtain safety labs 1-2 weeks after starting Entresto. Will follow up in 1 month.

## 2024-12-31 NOTE — ASSESSMENT & PLAN NOTE
Patient to stop losartan and start Entresto for GDMT optimization. Will have safety labs drawn 1-2 weeks after initiation. Continue other HF pharmacotherapy unchanged. Will follow up in 1 month.

## 2025-01-02 ENCOUNTER — PHARMACY VISIT (OUTPATIENT)
Dept: PHARMACY | Facility: CLINIC | Age: 70
End: 2025-01-02
Payer: MEDICARE

## 2025-01-30 LAB
ALBUMIN SERPL-MCNC: 4 G/DL (ref 3.6–5.1)
ALP SERPL-CCNC: 55 U/L (ref 35–144)
ALT SERPL-CCNC: 24 U/L (ref 9–46)
ANION GAP SERPL CALCULATED.4IONS-SCNC: 8 MMOL/L (CALC) (ref 7–17)
AST SERPL-CCNC: 24 U/L (ref 10–35)
BILIRUB SERPL-MCNC: 0.7 MG/DL (ref 0.2–1.2)
BNP SERPL-MCNC: 169 PG/ML
BUN SERPL-MCNC: 9 MG/DL (ref 7–25)
CALCIUM SERPL-MCNC: 8.9 MG/DL (ref 8.6–10.3)
CHLORIDE SERPL-SCNC: 107 MMOL/L (ref 98–110)
CHOLEST SERPL-MCNC: 107 MG/DL
CHOLEST/HDLC SERPL: 2.8 (CALC)
CO2 SERPL-SCNC: 24 MMOL/L (ref 20–32)
CREAT SERPL-MCNC: 0.87 MG/DL (ref 0.7–1.35)
EGFRCR SERPLBLD CKD-EPI 2021: 93 ML/MIN/1.73M2
ERYTHROCYTE [DISTWIDTH] IN BLOOD BY AUTOMATED COUNT: 13.1 % (ref 11–15)
GLUCOSE SERPL-MCNC: 106 MG/DL (ref 65–99)
HCT VFR BLD AUTO: 45.5 % (ref 38.5–50)
HDLC SERPL-MCNC: 38 MG/DL
HGB BLD-MCNC: 15.3 G/DL (ref 13.2–17.1)
LDLC SERPL CALC-MCNC: 49 MG/DL (CALC)
MAGNESIUM SERPL-MCNC: 2.3 MG/DL (ref 1.5–2.5)
MCH RBC QN AUTO: 33.6 PG (ref 27–33)
MCHC RBC AUTO-ENTMCNC: 33.6 G/DL (ref 32–36)
MCV RBC AUTO: 100 FL (ref 80–100)
NONHDLC SERPL-MCNC: 69 MG/DL (CALC)
PLATELET # BLD AUTO: 157 THOUSAND/UL (ref 140–400)
PMV BLD REES-ECKER: 11.7 FL (ref 7.5–12.5)
POTASSIUM SERPL-SCNC: 4.4 MMOL/L (ref 3.5–5.3)
PROT SERPL-MCNC: 7.1 G/DL (ref 6.1–8.1)
RBC # BLD AUTO: 4.55 MILLION/UL (ref 4.2–5.8)
SODIUM SERPL-SCNC: 139 MMOL/L (ref 135–146)
TRIGL SERPL-MCNC: 122 MG/DL
WBC # BLD AUTO: 8 THOUSAND/UL (ref 3.8–10.8)

## 2025-01-30 NOTE — PROGRESS NOTES
Pharmacist Clinic: Cardiology Management    Wing Stone is a 69 y.o. male was referred to Clinical Pharmacy Team for heart failure/cardiomyopathy management.     Referring Provider: Shailesh Dash, DO    THIS IS A FOLLOW UP PATIENT APPOINTMENT. AT LAST VISIT ON 12/31/2024 WITH PHARMACIST (Jannet Herrera).    Appointment was completed by the patient and his wife who was reached at .    REVIEW OF PAST APPNT (IF APPLICABLE):   Today's appointment was 1 month follow up regarding heart failure pharmacotherapy. Wing is doing well, has some shortness of breath but wife reports he currently has a cold. No adverse effects from pharmacotherapy or symptoms of worsening heart failure at this time.  Will have patient stop losartan and start Entresto for GDMT optimization. Wife reports he has taken Entresto in the past and has tolerated well. They are aware he will stop the losartan when starting Entresto and not to take the two together. Will also have him obtain safety labs 1-2 weeks after starting Entresto.  Will follow up in 1 month.    Allergies   Allergen Reactions    Relafen [Nabumetone] Itching       No past medical history on file.    Current Outpatient Medications on File Prior to Visit   Medication Sig Dispense Refill    ascorbic acid (Vitamin C) 1,000 mg tablet Take 1 tablet (1,000 mg) by mouth once daily.      aspirin 81 mg EC tablet Take by mouth once daily.      carvedilol (Coreg) 12.5 mg tablet TAKE 1 TABLET BY MOUTH TWICE DAILY WITH MEALS 180 tablet 3    cholecalciferol (Vitamin D-3) 50 mcg (2,000 unit) capsule Take 1 capsule (50 mcg) by mouth once daily.      dapagliflozin propanediol (Farxiga) 10 mg Take 1 tablet (10 mg) by mouth once daily in the morning. Take before meals. 90 tablet 3    ezetimibe (Zetia) 10 mg tablet TAKE 1 TABLET BY MOUTH AT BEDTIME 90 tablet 3    fish oil concentrate (Omega-3) 120-180 mg capsule Take 1 capsule (1 g) by mouth once daily.      naproxen sodium (Aleve) 220 mg  "tablet Take 1 tablet (220 mg) by mouth once daily as needed.      rosuvastatin (Crestor) 40 mg tablet Take 1 tablet (40 mg) by mouth once daily. 90 tablet 3    sacubitriL-valsartan (Entresto) 24-26 mg tablet Take 1 tablet by mouth 2 times a day. Stop losartan prior to taking Entresto, medications not to be taken together 180 tablet 3    spironolactone (Aldactone) 25 mg tablet Take 1 tablet by mouth once daily 90 tablet 1     No current facility-administered medications on file prior to visit.         RELEVANT LAB RESULTS:  Lab Results   Component Value Date    BILITOT 0.7 01/29/2025    CALCIUM 8.9 01/29/2025    CO2 24 01/29/2025     01/29/2025    CREATININE 0.87 01/29/2025    GLUCOSE 106 (H) 01/29/2025    ALKPHOS 55 01/29/2025    K 4.4 01/29/2025    PROT 7.1 01/29/2025     01/29/2025    AST 24 01/29/2025    ALT 24 01/29/2025    BUN 9 01/29/2025    ANIONGAP 8 01/29/2025    MG 2.3 01/29/2025    ALBUMIN 4.0 01/29/2025    GFRMALE 74 08/21/2023     Lab Results   Component Value Date    TRIG 122 01/29/2025    CHOL 107 01/29/2025    LDLCALC 49 01/29/2025    HDL 38 (L) 01/29/2025     No results found for: \"BMCBC\", \"CBCDIF\"     PHARMACEUTICAL ASSESSMENT:    MEDICATION RECONCILIATION    Home Pharmacy Reviewed? Yes, describe: [id number], [facility]    Drug Interactions? No clinically significant drug interactions requiring change in therapy found at the time of this visit.     Medication Documentation Review Audit       Reviewed by Jannet Herrera PharmD (Pharmacist) on 11/27/24 at 0905      Medication Order Taking? Sig Documenting Provider Last Dose Status   ascorbic acid (Vitamin C) 1,000 mg tablet [id number] Yes Take 1 tablet (1,000 mg) by mouth once daily. Historical Provider, MD  Active   aspirin 81 mg EC tablet [id number] Yes Take by mouth once daily. Historical Provider, MD  Active   carvedilol (Coreg) 12.5 mg tablet [id number] Yes TAKE 1 TABLET BY MOUTH TWICE DAILY WITH MEALS Olesya Allison, APRN-CNP "  Active   cholecalciferol (Vitamin D-3) 50 mcg (2,000 unit) capsule [id number] Yes Take 1 capsule (50 mcg) by mouth once daily. Historical Provider, MD  Active   dapagliflozin propanediol (Farxiga) 10 mg [id number] Yes Take 1 tablet (10 mg) by mouth once daily in the morning. Take before meals. MANJULA Boland  Active   ezetimibe (Zetia) 10 mg tablet [id number] Yes TAKE 1 TABLET BY MOUTH AT BEDTIME Riky Garcia PA-C  Active   fish oil concentrate (Omega-3) 120-180 mg capsule [id number] Yes Take 1 capsule (1 g) by mouth once daily. Historical Provider, MD  Active   losartan (Cozaar) 50 mg tablet [id number] Yes Take 1 tablet (50 mg) by mouth once daily. MANJULA Boland  Active   naproxen sodium (Aleve) 220 mg tablet [id number] Yes Take 1 tablet (220 mg) by mouth once daily as needed. Historical Provider, MD  Active   rosuvastatin (Crestor) 40 mg tablet [id number] Yes Take 1 tablet (40 mg) by mouth once daily. MANJULA Shook  Active   spironolactone (Aldactone) 25 mg tablet [id number] Yes Take 1 tablet by mouth once daily MANJULA Shook  Active                    DISEASE MANAGEMENT ASSESSMENT:     CHF ASSESSMENT     Symptom/Staging:  -Most recent ejection fraction: 45%  -NYHA Stage: Unknown    Results for orders placed in visit on 02/16/23    [place] [address]  Phone [phone] Fax [phone]    TRANSTHORACIC ECHOCARDIOGRAM REPORT      Patient Name:     DEBBIE Oleary Physician:  [id number] Jaleel Bowman MD  Study Date:       2/16/2023      Referring           JUAN DAVID AMBROSIO  Physician:  MRN/PID:          [id number]       PCP:  Accession/Order#: [id number]   [facility]  Location:  YOB: 1955      Fellow:  Gender:           M              Nurse:              Delaney Patel RN  Admit Date:                      Sonographer:        Bharati Carolina  [facility]  Admission Status: Outpatient     Additional Staff:  Height:           175.26 cm      CC Report to:  Weight:           108.86 kg      Study Type:         Echocardiogram  BSA:              2.23 m2  Blood Pressure: 118 /70 mmHg    Diagnosis/ICD: R94.31-Abnormal electrocardiogram [ECG] [EKG];  I25.10-Atherosclerotic heart disease of native coronary artery  without angina pectoris; I50.20-Unspecified systolic (congestive)  heart failure (CHF)  Indication:    Congestive Heart Failure, Abnormal EKG  Procedure/CPT: Echo Complete w Full [id number]    Patient History:  Pertinent History: CAD, CABG (2013), HTN.    Study Detail: The following Echo studies were performed: 2D, M-Mode, Doppler and  color flow. Technically challenging study due to poor acoustic  windows. Optison used as a contrast agent for endocardial border  definition. Total contrast used for this procedure was 3 mL via IV  push.      PHYSICIAN INTERPRETATION:  Left Ventricle: Left ventricular systolic function is mildly decreased, with an estimated ejection fraction of 45%. Wall motion is abnormal. The left ventricular cavity size is normal. Left ventricular diastolic filling was not assessed. Apical and anterolateral segments appear hypokinetic.  Left Atrium: The left atrium is normal in size.  Right Ventricle: The right ventricle is normal in size. There is mildly reduced right ventricular systolic function.  Right Atrium: The right atrium is normal in size.  Aortic Valve: The aortic valve is trileaflet. There is no evidence of aortic valve regurgitation. The peak instantaneous gradient of the aortic valve is 7.4 mmHg. The mean gradient of the aortic valve is 4.1 mmHg.  Mitral Valve: The mitral valve is normal in structure. There is trace mitral valve regurgitation.  Tricuspid Valve: The tricuspid valve is structurally normal. No evidence of tricuspid regurgitation.  Pulmonic Valve: The pulmonic valve is structurally normal. There is physiologic  pulmonic valve regurgitation.  Pericardium: There is no pericardial effusion noted.  Aorta: The aortic root is normal.      CONCLUSIONS:  1. Left ventricular systolic function is mildly decreased with a 45% estimated ejection fraction.  2. Apical and anterolateral segments appear hypokinetic.  3. There is mildly reduced right ventricular systolic function.    QUANTITATIVE DATA SUMMARY:  2D MEASUREMENTS:  Normal Ranges:  LAs:           4.15 cm    (2.7-4.0cm)  IVSd:          1.04 cm    (0.6-1.1cm)  LVPWd:         1.00 cm    (0.6-1.1cm)  LVIDd:         6.26 cm    (3.9-5.9cm)  LVIDs:         5.26 cm  LV Mass Index: 121.8 g/m2  LV % FS        16.0 %    LA VOLUME:  Normal Ranges:  LA Vol A4C:        58.9 ml    (22+/-6mL/m2)  LA Vol A2C:        53.2 ml  LA Vol BP:         56.4 ml  LA Vol Index A4C:  26.4 ml/m2  LA Vol Index A2C:  23.8 ml/m2  LA Vol Index BP:   25.3 ml/m2  LA Area A4C:       20.9 cm2  LA Area A2C:       19.7 cm2  LA Major Axis A4C: 6.3 cm  LA Major Axis A2C: 6.2 cm  LA Volume Index:   25.3 ml/m2  LA Vol A4C:        55.7 ml  LA Vol A2C:        50.0 ml    RA VOLUME BY A/L METHOD:  Normal Ranges:  RA Area A4C: 18.5 cm2    AORTA MEASUREMENTS:  Normal Ranges:  Ao Sinus, d: 3.60 cm (2.1-3.5cm)  Ao STJ, d:   2.90 cm (1.7-3.4cm)  Asc Ao, d:   3.10 cm (2.1-3.4cm)    LV SYSTOLIC FUNCTION BY 2D PLANIMETRY (MOD):  Normal Ranges:  EF-A4C View: 45.1 % (>=55%)  EF-A2C View: 38.9 %  EF-Biplane:  44.1 %    LV DIASTOLIC FUNCTION:  Normal Ranges:  MV Peak E:        1.09 m/s    (0.7-1.2 m/s)  MV Peak A:        0.88 m/s    (0.42-0.7 m/s)  E/A Ratio:        1.24        (1.0-2.2)  MV A Dur:         121.79 msec  PulmV A Revs Dur: 163.65 msec    MITRAL VALVE:  Normal Ranges:  MV DT: 241 msec (150-240msec)    MITRAL INSUFFICIENCY:  Normal Ranges:  PISA Radius:  0.5 cm  MR VTI:       167.73 cm  MR Vmax:      483.82 cm/s  MR Alias Yasmany: 39.3 cm/s  MR Volume:    24.08 ml  MR Flow Rt:   69.47 ml/s  MR EROA:      0.14 cm2  dP/dt:         855 mmHg/s  (>1200mmHg/sec)    AORTIC VALVE:  Normal Ranges:  AoV Vmax:                1.36 m/s (<=1.7m/s)  AoV Peak P.4 mmHg (<20mmHg)  AoV Mean P.1 mmHg (1.7-11.5mmHg)  LVOT Max Yasmany:            1.01 m/s (<=1.1m/s)  AoV VTI:                 32.26 cm (18-25cm)  LVOT VTI:                23.79 cm  LVOT Diameter:           2.24 cm  (1.8-2.4cm)  AoV Area, VTI:           2.90 cm2 (2.5-5.5cm2)  AoV Area,Vmax:           2.91 cm2 (2.5-4.5cm2)  AoV Dimensionless Index: 0.74    RIGHT VENTRICLE:  RV 1   3.4 cm  RV 2   2.5 cm  RV 3   7.7 cm  TAPSE: 19.6 mm  RV s'  0.11 m/s    TRICUSPID VALVE/RVSP:  Normal Ranges:  Peak TR Velocity: 2.24 m/s  RV Syst Pressure: 23.1 mmHg (< 30mmHg)  TV E Vmax:        0.46 m/s  (0.3-0.7m/s)  TV A Vmax:        0.45 m/s  IVC Diam:         1.60 cm  TV e'             0.1 m/s    Pulmonary Veins:  PulmV A Revs Dur: 163.65 msec    AORTA:  Asc Ao Diam 3.15 cm      [id number] Jaleel Bowman MD  Electronically signed on 2023 at 4:53:15 PM         Final       Guideline-Directed Medical Therapy:  -ARNI: No   -If no, then ACEi/ARB?: Yes, describe: Losartan 50 mg every day   -Beta Blocker: Yes, describe: Carvedilol 12.5 mg BID  -MRA: Yes, describe: Spironolactone 25 mg every day   -SGLT2i: Yes, describe: Farxiga 10 mg every day     Secondary Prevention:  -The ASCVD Risk score (Emre SAINZ, et al., 2019) failed to calculate for the following reasons:    The valid total cholesterol range is 130 to 320 mg/dL   -Aspirin 81mg? yes  -Statin?: Yes, describe: Rosuvastatin 40 mg every day   -HTN?: Yes, describe: 138/77 as of 2024    CURRENT PHARMACOTHERAPY:   Carvedilol 12.5 mg BID  Farxiga 10 mg every day   Entresto 24-26 mg BID  Spironolactone 25 mg every day     Affordability: [facility] active through 2025  Adherence/Compliance: No concerns; patient uses a pill box for organization. No recent missed doses per patient's wife  Adverse Effects: Some fatigue, nothing that  interferes with typical daily activities    Monitoring Weights at Home: Yes; only about once every 2-3 weeks  Home Weight Recordings: ~235 lbs (has lost weight recently)    Past In Office Weight Readings:   Wt Readings from Last 6 Encounters:   11/20/24 111 kg (245 lb)   09/25/24 111 kg (244 lb 3.2 oz)   03/22/24 112 kg (246 lb 12.8 oz)   09/22/23 111 kg (245 lb 6 oz)   02/23/23 113 kg (249 lb)   08/11/22 116 kg (255 lb 2 oz)       Monitoring Blood Pressure at Home: Yes, about 1-2 times per week  Home BP Recordings: ~118/62 (denies any dizziness or lightheadedness)    Past In Office BP Readings:   BP Readings from Last 6 Encounters:   11/20/24 138/77   09/25/24 132/78   03/22/24 144/74   09/22/23 148/74   02/23/23 130/82   08/11/22 118/70       HEALTH MANAGEMENT    Maintaining fluid restriction (<2 L/day): N/A  Edema/swelling: No  Shortness of breath: Yes- only upon exertion (ex: yardwork), none with rest  Trouble sleeping/lying down: No  Dry/hacking cough: No  Recent Hospitalizations: No    EDUCATION/COUNSELING:   - Counseled patient on MOA, expectations, duration of therapy, contraindications, administration, and monitoring parameters  - Counseled patient on lifestyle modifications that can decrease your risk of having complications (smoking cessation, losing weight, daily weights, vaccines)  - Counseled patient on fluid intake and weight management. Recommended to not consume more than 2 liters of fliuids per day. If they have gained more than 2-3 pounds within a 24 hours period (or 5 pounds in a week), contact their cardiologist  - Answered all patient questions and concerns       DISCUSSION/NOTES:   Today's appointment was 1 month follow up regarding heart failure. Wing confirms stopping losartan and starting Entresto. He has tolerated this change well. Reports some fatigue, but nothing too bothersome or affecting his daily activities. Will titrate Entresto for further GDMT optimization. Patient may use 2  tablets twice daily of 24-26 mg strength until 49-51 mg strength arrives from the pharmacy. Advised patient/spouse to call if any dizziness/lightheadedness occurs, or if fatigue worsens and is affecting daily activities. Also encouraged to continue BP monitoring as well as HR monitoring.  Will have safety labs drawn 2 weeks after dosage increase, and follow up in 1 month.       ASSESSMENT:    Assessment/Plan   Problem List Items Addressed This Visit       Heart failure with mid-range ejection fraction (HFmEF)     Wing is prescribed 4/4 GDMT. Will titrate Entresto for further optimization and follow up in 1 month.         Relevant Orders    Referral to Clinical Pharmacy       RECOMMENDATIONS/PLAN:    Increase: Entresto 49-51 mg BID  Continue:  Carvedilol 12.5 mg BID  Farxiga 10 mg every day   Spironolactone 25 mg every day   Follow up: 1 month    Last Appnt with Referring Provider: 9/25/2024  Next Appnt with Referring Provider: 3/31/2025  Clinical Pharmacist follow up: 2/28/2025  VAF/Application Expiration: Yes    Date: 12/2/2025   Type of Encounter: Ezra Herrera PharmD    Verbal consent to manage patient's drug therapy was obtained from the patient . They were informed they may decline to participate or withdraw from participation in pharmacy services at any time.    Continue all meds under the continuation of care with the referring provider and clinical pharmacy team.

## 2025-01-31 ENCOUNTER — APPOINTMENT (OUTPATIENT)
Dept: PHARMACY | Facility: HOSPITAL | Age: 70
End: 2025-01-31
Payer: MEDICARE

## 2025-01-31 DIAGNOSIS — I50.22 HEART FAILURE WITH MID-RANGE EJECTION FRACTION (HFMEF): ICD-10-CM

## 2025-01-31 PROCEDURE — RXMED WILLOW AMBULATORY MEDICATION CHARGE

## 2025-01-31 RX ORDER — SACUBITRIL AND VALSARTAN 49; 51 MG/1; MG/1
1 TABLET, FILM COATED ORAL 2 TIMES DAILY
Qty: 180 TABLET | Refills: 3 | Status: SHIPPED | OUTPATIENT
Start: 2025-01-31

## 2025-01-31 NOTE — ASSESSMENT & PLAN NOTE
Wing is prescribed 4/4 GDMT. Will titrate Entresto for further optimization and follow up in 1 month.

## 2025-01-31 NOTE — Clinical Note
Wing Nava Dr. confirms stopping losartan and starting Entresto. He has tolerated this change well. Reports some fatigue, but nothing too bothersome or affecting his daily activities. Will titrate Entresto for further GDMT optimization. Advised patient/spouse to call if any dizziness/lightheadedness occurs, or if fatigue worsens and is affecting daily activities.  Will have safety labs drawn 2 weeks after dosage increase, and follow up in 1 month.

## 2025-02-06 ENCOUNTER — PHARMACY VISIT (OUTPATIENT)
Dept: PHARMACY | Facility: CLINIC | Age: 70
End: 2025-02-06
Payer: MEDICARE

## 2025-02-27 NOTE — PROGRESS NOTES
Pharmacist Clinic: Cardiology Management    Wing Stone is a 69 y.o. male was referred to Clinical Pharmacy Team for heart failure/cardiomyopathy management.     Referring Provider: Shailesh Dash, DO    THIS IS A FOLLOW UP PATIENT APPOINTMENT. AT LAST VISIT ON 1/31/2025 WITH PHARMACIST (Jannet Herrera).    Appointment was completed by the patient and his wife who was reached at .    REVIEW OF PAST APPNT (IF APPLICABLE):   Today's appointment was 1 month follow up regarding heart failure. Wing confirms stopping losartan and starting Entresto. He has tolerated this change well. Reports some fatigue, but nothing too bothersome or affecting his daily activities. Will titrate Entresto for further GDMT optimization. Patient may use 2 tablets twice daily of 24-26 mg strength until 49-51 mg strength arrives from the pharmacy. Advised patient/spouse to call if any dizziness/lightheadedness occurs, or if fatigue worsens and is affecting daily activities. Also encouraged to continue BP monitoring as well as HR monitoring.  Will have safety labs drawn 2 weeks after dosage increase, and follow up in 1 month.    Allergies   Allergen Reactions    Relafen [Nabumetone] Itching       No past medical history on file.    Current Outpatient Medications on File Prior to Visit   Medication Sig Dispense Refill    ascorbic acid (Vitamin C) 1,000 mg tablet Take 1 tablet (1,000 mg) by mouth once daily.      aspirin 81 mg EC tablet Take by mouth once daily.      carvedilol (Coreg) 12.5 mg tablet TAKE 1 TABLET BY MOUTH TWICE DAILY WITH MEALS 180 tablet 3    cholecalciferol (Vitamin D-3) 50 mcg (2,000 unit) capsule Take 1 capsule (50 mcg) by mouth once daily.      dapagliflozin propanediol (Farxiga) 10 mg Take 1 tablet (10 mg) by mouth once daily in the morning. Take before meals. 90 tablet 3    ezetimibe (Zetia) 10 mg tablet TAKE 1 TABLET BY MOUTH AT BEDTIME 90 tablet 3    fish oil concentrate (Omega-3) 120-180 mg capsule Take 1  "capsule (1 g) by mouth once daily.      naproxen sodium (Aleve) 220 mg tablet Take 1 tablet (220 mg) by mouth once daily as needed.      rosuvastatin (Crestor) 40 mg tablet Take 1 tablet (40 mg) by mouth once daily. 90 tablet 3    sacubitriL-valsartan (Entresto) 49-51 mg tablet Take 1 tablet by mouth 2 times a day. 180 tablet 3    spironolactone (Aldactone) 25 mg tablet Take 1 tablet by mouth once daily 90 tablet 1     No current facility-administered medications on file prior to visit.         RELEVANT LAB RESULTS:  Lab Results   Component Value Date    BILITOT 0.7 01/29/2025    CALCIUM 8.9 01/29/2025    CO2 24 01/29/2025     01/29/2025    CREATININE 0.87 01/29/2025    GLUCOSE 106 (H) 01/29/2025    ALKPHOS 55 01/29/2025    K 4.4 01/29/2025    PROT 7.1 01/29/2025     01/29/2025    AST 24 01/29/2025    ALT 24 01/29/2025    BUN 9 01/29/2025    ANIONGAP 8 01/29/2025    MG 2.3 01/29/2025    ALBUMIN 4.0 01/29/2025    GFRMALE 74 08/21/2023     Lab Results   Component Value Date    TRIG 122 01/29/2025    CHOL 107 01/29/2025    LDLCALC 49 01/29/2025    HDL 38 (L) 01/29/2025     No results found for: \"BMCBC\", \"CBCDIF\"     PHARMACEUTICAL ASSESSMENT:    Drug Interactions? No clinically significant drug interactions requiring change in therapy found at the time of this visit.     Medication Documentation Review Audit       Reviewed by Jannet Herrera PharmD (Pharmacist) on 11/27/24 at 0905      Medication Order Taking? Sig Documenting Provider Last Dose Status   ascorbic acid (Vitamin C) 1,000 mg tablet [id number] Yes Take 1 tablet (1,000 mg) by mouth once daily. Historical Provider, MD  Active   aspirin 81 mg EC tablet [id number] Yes Take by mouth once daily. Historical Provider, MD  Active   carvedilol (Coreg) 12.5 mg tablet [id number] Yes TAKE 1 TABLET BY MOUTH TWICE DAILY WITH MEALS Olesya Allison, APRN-CNP  Active   cholecalciferol (Vitamin D-3) 50 mcg (2,000 unit) capsule [id number] Yes Take 1 capsule (50 mcg) by " mouth once daily. Historical Provider, MD  Active   dapagliflozin propanediol (Farxiga) 10 mg [id number] Yes Take 1 tablet (10 mg) by mouth once daily in the morning. Take before meals. MANJULA Boland  Active   ezetimibe (Zetia) 10 mg tablet [id number] Yes TAKE 1 TABLET BY MOUTH AT BEDTIME Riky Garcia PA-C  Active   fish oil concentrate (Omega-3) 120-180 mg capsule [id number] Yes Take 1 capsule (1 g) by mouth once daily. Historical Provider, MD  Active   losartan (Cozaar) 50 mg tablet [id number] Yes Take 1 tablet (50 mg) by mouth once daily. MANJULA Boland  Active   naproxen sodium (Aleve) 220 mg tablet [id number] Yes Take 1 tablet (220 mg) by mouth once daily as needed. Historical Provider, MD  Active   rosuvastatin (Crestor) 40 mg tablet [id number] Yes Take 1 tablet (40 mg) by mouth once daily. MANJULA Shook  Active   spironolactone (Aldactone) 25 mg tablet [id number] Yes Take 1 tablet by mouth once daily MANJULA Shook  Active                    DISEASE MANAGEMENT ASSESSMENT:     CHF ASSESSMENT     Symptom/Staging:  -Most recent ejection fraction: 45%  -NYHA Stage: Unknown    Results for orders placed in visit on 02/16/23    [place] [address]  Phone [phone] Fax [phone]    TRANSTHORACIC ECHOCARDIOGRAM REPORT      Patient Name:     DEBBIE Oleary Physician:  [id number] Jaleel Bowman MD  Study Date:       2/16/2023      Referring           JUAN DAVID AMBROSIO  Physician:  MRN/PID:          [id number]       PCP:  Accession/Order#: [id number]   [facility] Echo Lab  Location:  YOB: 1955      Fellow:  Gender:           M              Nurse:              Delaney Patel RN  Admit Date:                      Sonographer:        Bharati Carolina RDCS  Admission Status: Outpatient     Additional Staff:  Height:           175.26 cm      CC Report to:  Weight:            108.86 kg      Study Type:         Echocardiogram  BSA:              2.23 m2  Blood Pressure: 118 /70 mmHg    Diagnosis/ICD: R94.31-Abnormal electrocardiogram [ECG] [EKG];  I25.10-Atherosclerotic heart disease of native coronary artery  without angina pectoris; I50.20-Unspecified systolic (congestive)  heart failure (CHF)  Indication:    Congestive Heart Failure, Abnormal EKG  Procedure/CPT: Echo Complete w Full Doppler-[id number]    Patient History:  Pertinent History: CAD, CABG (2013), HTN.    Study Detail: The following Echo studies were performed: 2D, M-Mode, Doppler and  color flow. Technically challenging study due to poor acoustic  windows. Optison used as a contrast agent for endocardial border  definition. Total contrast used for this procedure was 3 mL via IV  push.      PHYSICIAN INTERPRETATION:  Left Ventricle: Left ventricular systolic function is mildly decreased, with an estimated ejection fraction of 45%. Wall motion is abnormal. The left ventricular cavity size is normal. Left ventricular diastolic filling was not assessed. Apical and anterolateral segments appear hypokinetic.  Left Atrium: The left atrium is normal in size.  Right Ventricle: The right ventricle is normal in size. There is mildly reduced right ventricular systolic function.  Right Atrium: The right atrium is normal in size.  Aortic Valve: The aortic valve is trileaflet. There is no evidence of aortic valve regurgitation. The peak instantaneous gradient of the aortic valve is 7.4 mmHg. The mean gradient of the aortic valve is 4.1 mmHg.  Mitral Valve: The mitral valve is normal in structure. There is trace mitral valve regurgitation.  Tricuspid Valve: The tricuspid valve is structurally normal. No evidence of tricuspid regurgitation.  Pulmonic Valve: The pulmonic valve is structurally normal. There is physiologic pulmonic valve regurgitation.  Pericardium: There is no pericardial effusion noted.  Aorta: The aortic root is  normal.      CONCLUSIONS:  1. Left ventricular systolic function is mildly decreased with a 45% estimated ejection fraction.  2. Apical and anterolateral segments appear hypokinetic.  3. There is mildly reduced right ventricular systolic function.    QUANTITATIVE DATA SUMMARY:  2D MEASUREMENTS:  Normal Ranges:  LAs:           4.15 cm    (2.7-4.0cm)  IVSd:          1.04 cm    (0.6-1.1cm)  LVPWd:         1.00 cm    (0.6-1.1cm)  LVIDd:         6.26 cm    (3.9-5.9cm)  LVIDs:         5.26 cm  LV Mass Index: 121.8 g/m2  LV % FS        16.0 %    LA VOLUME:  Normal Ranges:  LA Vol A4C:        58.9 ml    (22+/-6mL/m2)  LA Vol A2C:        53.2 ml  LA Vol BP:         56.4 ml  LA Vol Index A4C:  26.4 ml/m2  LA Vol Index A2C:  23.8 ml/m2  LA Vol Index BP:   25.3 ml/m2  LA Area A4C:       20.9 cm2  LA Area A2C:       19.7 cm2  LA Major Axis A4C: 6.3 cm  LA Major Axis A2C: 6.2 cm  LA Volume Index:   25.3 ml/m2  LA Vol A4C:        55.7 ml  LA Vol A2C:        50.0 ml    RA VOLUME BY A/L METHOD:  Normal Ranges:  RA Area A4C: 18.5 cm2    AORTA MEASUREMENTS:  Normal Ranges:  Ao Sinus, d: 3.60 cm (2.1-3.5cm)  Ao STJ, d:   2.90 cm (1.7-3.4cm)  Asc Ao, d:   3.10 cm (2.1-3.4cm)    LV SYSTOLIC FUNCTION BY 2D PLANIMETRY (MOD):  Normal Ranges:  EF-A4C View: 45.1 % (>=55%)  EF-A2C View: 38.9 %  EF-Biplane:  44.1 %    LV DIASTOLIC FUNCTION:  Normal Ranges:  MV Peak E:        1.09 m/s    (0.7-1.2 m/s)  MV Peak A:        0.88 m/s    (0.42-0.7 m/s)  E/A Ratio:        1.24        (1.0-2.2)  MV A Dur:         121.79 msec  PulmV A Revs Dur: 163.65 msec    MITRAL VALVE:  Normal Ranges:  MV DT: 241 msec (150-240msec)    MITRAL INSUFFICIENCY:  Normal Ranges:  PISA Radius:  0.5 cm  MR VTI:       167.73 cm  MR Vmax:      483.82 cm/s  MR Alias Yasmany: 39.3 cm/s  MR Volume:    24.08 ml  MR Flow Rt:   69.47 ml/s  MR EROA:      0.14 cm2  dP/dt:        855 mmHg/s  (>1200mmHg/sec)    AORTIC VALVE:  Normal Ranges:  AoV Vmax:                1.36 m/s (<=1.7m/s)  AoV  Peak P.4 mmHg (<20mmHg)  AoV Mean P.1 mmHg (1.7-11.5mmHg)  LVOT Max Yasmany:            1.01 m/s (<=1.1m/s)  AoV VTI:                 32.26 cm (18-25cm)  LVOT VTI:                23.79 cm  LVOT Diameter:           2.24 cm  (1.8-2.4cm)  AoV Area, VTI:           2.90 cm2 (2.5-5.5cm2)  AoV Area,Vmax:           2.91 cm2 (2.5-4.5cm2)  AoV Dimensionless Index: 0.74    RIGHT VENTRICLE:  RV 1   3.4 cm  RV 2   2.5 cm  RV 3   7.7 cm  TAPSE: 19.6 mm  RV s'  0.11 m/s    TRICUSPID VALVE/RVSP:  Normal Ranges:  Peak TR Velocity: 2.24 m/s  RV Syst Pressure: 23.1 mmHg (< 30mmHg)  TV E Vmax:        0.46 m/s  (0.3-0.7m/s)  TV A Vmax:        0.45 m/s  IVC Diam:         1.60 cm  TV e'             0.1 m/s    Pulmonary Veins:  PulmV A Revs Dur: 163.65 msec    AORTA:  Asc Ao Diam 3.15 cm      [id number] Jaleel Bowman MD  Electronically signed on 2023 at 4:53:15 PM         Final       Guideline-Directed Medical Therapy:  -ARNI: No   -If no, then ACEi/ARB?: Yes, describe: Losartan 50 mg every day   -Beta Blocker: Yes, describe: Carvedilol 12.5 mg BID  -MRA: Yes, describe: Spironolactone 25 mg every day   -SGLT2i: Yes, describe: Farxiga 10 mg every day     Secondary Prevention:  -The ASCVD Risk score (Emre DK, et al., 2019) failed to calculate for the following reasons:    The valid total cholesterol range is 130 to 320 mg/dL   -Aspirin 81mg? yes  -Statin?: Yes, describe: Rosuvastatin 40 mg every day   -HTN?: Yes, describe: 138/77 as of 2024    CURRENT PHARMACOTHERAPY:   Carvedilol 12.5 mg BID  Farxiga 10 mg every day   Entresto 49-51 mg BID  Spironolactone 25 mg every day     Affordability:  PAP active through 2025  Adherence/Compliance: No concerns; patient uses a pill box for organization.   Adverse Effects: none    Monitoring Weights at Home: Yes; only about once every 2-3 weeks  Home Weight Recordings: ~240 lbs (no acute fluctuations)     Past In Office Weight Readings:   Wt Readings from  Last 6 Encounters:   11/20/24 111 kg (245 lb)   09/25/24 111 kg (244 lb 3.2 oz)   03/22/24 112 kg (246 lb 12.8 oz)   09/22/23 111 kg (245 lb 6 oz)   02/23/23 113 kg (249 lb)   08/11/22 116 kg (255 lb 2 oz)       Monitoring Blood Pressure at Home: Yes, about 1-2 times per week  Home BP Recordings: 118/67 HR 66 this morning (denies dizziness or lightheadedness)    Past In Office BP Readings:   BP Readings from Last 6 Encounters:   11/20/24 138/77   09/25/24 132/78   03/22/24 144/74   09/22/23 148/74   02/23/23 130/82   08/11/22 118/70       HEALTH MANAGEMENT    Maintaining fluid restriction (<2 L/day): N/A  Edema/swelling: No  Shortness of breath: Yes- only upon exertion (ex: yardwork), none with rest or typical daily activities/walking  Trouble sleeping/lying down: No  Dry/hacking cough: No consistent cough, has a slight cough with phlegm from a recent illness but this is improving  Recent Hospitalizations: No    EDUCATION/COUNSELING:   - Counseled patient on MOA, expectations, duration of therapy, contraindications, administration, and monitoring parameters  - Counseled patient on lifestyle modifications that can decrease your risk of having complications (smoking cessation, losing weight, daily weights, vaccines)  - Counseled patient on fluid intake and weight management. Recommended to not consume more than 2 liters of fliuids per day. If they have gained more than 2-3 pounds within a 24 hours period (or 5 pounds in a week), contact their cardiologist  - Answered all patient questions and concerns       DISCUSSION/NOTES:   Today's appointment was 1 month follow up regarding heart failure pharmacotherapy. Appointment was completed with the patient and patient's spouse. They report he has started the increased dose of Entresto and is tolerating this well. Will defer further dosage increase until updated labwork is completed. Wife confirms they will have this completed prior to next appointment. Will not increase  carvedilol due to lower patient reported HR readings.   Will follow up in 2 months.        ASSESSMENT:    Assessment/Plan   Problem List Items Addressed This Visit       Heart failure with mid-range ejection fraction (HFmEF)     Wing is prescribed 4/4 GMDT. Will discuss further titration of Entresto at next appointment, pending updated safety labs. Will follow up in 2 months.         Relevant Orders    Referral to Clinical Pharmacy       RECOMMENDATIONS/PLAN:    Continue:  Carvedilol 12.5 mg BID  Farxiga 10 mg every day   Entresto 49-51 mg BID  Spironolactone 25 mg every day     Last Appnt with Referring Provider: 9/25/2024  Next Appnt with Referring Provider: 3/31/2025  Clinical Pharmacist follow up: 4/28/2025  VAF/Application Expiration: Yes    Date: 12/2/2025   Type of Encounter: Ezra Herrera PharmD    Verbal consent to manage patient's drug therapy was obtained from the patient . They were informed they may decline to participate or withdraw from participation in pharmacy services at any time.    Continue all meds under the continuation of care with the referring provider and clinical pharmacy team.

## 2025-02-28 ENCOUNTER — APPOINTMENT (OUTPATIENT)
Dept: PHARMACY | Facility: HOSPITAL | Age: 70
End: 2025-02-28
Payer: MEDICARE

## 2025-02-28 DIAGNOSIS — I50.22 HEART FAILURE WITH MID-RANGE EJECTION FRACTION (HFMEF): ICD-10-CM

## 2025-02-28 NOTE — Clinical Note
Marija Dash, Today's appointment was 1 month follow up regarding heart failure pharmacotherapy. Appointment was completed with the patient and patient's spouse. They report he has started the increased dose of Entresto and is tolerating this well. Will defer further dosage increase until updated labwork is completed. Wife confirms they will have this completed prior to next appointment. Will not increase carvedilol due to lower patient reported HR readings.  Will follow up in 2 months.

## 2025-02-28 NOTE — ASSESSMENT & PLAN NOTE
Wing is prescribed 4/4 GMDT. Will discuss further titration of Entresto at next appointment, pending updated safety labs. Will follow up in 2 months.

## 2025-03-18 LAB
ANION GAP SERPL CALCULATED.4IONS-SCNC: 9 MMOL/L (CALC) (ref 7–17)
BUN SERPL-MCNC: 11 MG/DL (ref 7–25)
BUN/CREAT SERPL: NORMAL (CALC) (ref 6–22)
CALCIUM SERPL-MCNC: 9.8 MG/DL (ref 8.6–10.3)
CHLORIDE SERPL-SCNC: 105 MMOL/L (ref 98–110)
CO2 SERPL-SCNC: 24 MMOL/L (ref 20–32)
CREAT SERPL-MCNC: 0.99 MG/DL (ref 0.7–1.35)
EGFRCR SERPLBLD CKD-EPI 2021: 82 ML/MIN/1.73M2
GLUCOSE SERPL-MCNC: 98 MG/DL (ref 65–139)
POTASSIUM SERPL-SCNC: 4.6 MMOL/L (ref 3.5–5.3)
SODIUM SERPL-SCNC: 138 MMOL/L (ref 135–146)

## 2025-03-18 PROCEDURE — RXMED WILLOW AMBULATORY MEDICATION CHARGE

## 2025-03-19 PROBLEM — I77.9 CAROTID ARTERY DISEASE (CMS-HCC): Status: ACTIVE | Noted: 2025-03-19

## 2025-03-19 NOTE — PROGRESS NOTES
[facility] V[facility] Cardiology    Patient Name: Wing Stone  Patient : 1955    Scribe Attestation  By signing my name below, Denisha KAM, Lory attest that this documentation has been prepared under the direction and in the presence of Shailesh Dash DO.    Physician Attestation  Shailesh KAM DO, personally performed the services described in the documentation as scribed by Denisha Ibarra in my presence, and confirm it is both accurate and complete.    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  The patient was last evaluated by me in 2024.  At that visit I ordered a carotid ultrasound, ordered blood work including CMP/lipid/magnesium/CBC/BNP to be drawn in 6 months, and asked the patient to follow-up in 6 months and sooner if necessary.  CMP done in 2025 showed normal serum sodium and potassium with a serum creatinine of 0.87, normal ALT/AST, serum magnesium was 2.3, BNP was 169, CBC showed a hemoglobin of 15.3.  Lipid panel done in 2025 showed an LDL cholesterol 49 and triglycerides of 122 while on rosuvastatin 40 mg daily and Zetia 10 mg daily. BMP done on 3/18/2025 showed normal serum sodium and serum potassium with a serum creatinine of 0.99. Carotid ultrasound done in 2024 showed a 50 to 69% left internal carotid artery stenosis and less than 50% right internal carotid artery stenosis.  Patient was subsequently referred to vascular surgery for long-term management. ECG done today showed sinus rhythm with a heart rate  of 65 bpm.  The patient reports that he has been feeling generally well from the cardiac standpoint. He denies any new chest pain, shortness of breath, palpitations and lightheadedness. He states that he takes all of his medications as prescribed. During my exam, he was resting comfortably on the exam table.            Assessment/Plan:   1. 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  Echocardiogram done on 2/16/2023 showed mildly decreased left ventricular systolic function with a 45% estimated ejection fraction, apical and anterolateral segments appear hypokinetic. There is mildly reduced right ventricular systolic function.  He does have 1+ pitting bilateral lower extremity edema on exam today.  He should continue his current cardiac medications.   Recent lab works as noted in the HPI.  Echocardiogram was ordered as noted below.   Lab works as noted below will be done in 6 months prior to his next visit.   I discussed with him the importance of following a low-sodium heart healthy diet as well as weight loss, wearing compression stockings and elevating legs when seated.   Follow up in 6 months and sooner if necessary.      2. Coronary artery disease  Patient has a history of coronary artery disease with prior bypass done in 2013.  ECG done today showed sinus rhythm with a heart rate of 65 bpm.  He denies anginal chest discomfort.  Blood pressure appears controlled on exam today.  He should continue his current antihypertensive medications.   Echocardiogram done on 2/16/2023 showed mildly decreased left ventricular systolic function with a 45% estimated ejection fraction, apical and anterolateral segments appear hypokinetic. There is mildly reduced right ventricular systolic function.  Recent lab works as noted in the HPI.   Lipid panel done in January 2025 showed an LDL cholesterol 49 and triglycerides of 122 while on rosuvastatin 40 mg daily and Zetia 10 mg  daily.  Echocardiogram was ordered as noted below.   Lab works as noted below will be done in 6 months prior to his next visit.   Please also see lifestyle recommendations below.  Follow up in 6 months and sooner if necessary.      3. Hypertension  The patient has a history of hypertension which appears controlled on exam today.  He should continue his current antihypertensive medications and monitor his blood pressure at home.      4. Dyslipidemia  Lipid panel done in January 2025 showed an LDL cholesterol 49 and triglycerides of 122 while on rosuvastatin 40 mg daily and Zetia 10 mg daily.  Please see lifestyle recommendations below.     5. Carotid artery disease  Carotid ultrasound done in October 2024 showed a 50 to 69% left internal carotid artery stenosis and less than 50% right internal carotid artery stenosis.   Patient should follow up with Vascular Surgery for further evaluation and management.  Continue risk factor modification.    6. Obesity  Please see lifestyle recommendations below.        Orders:   Echocardiogram,   BMP/BNP/CBC/magnesium in 6 months,   Follow-up in 6 months.        Lifestyle Recommendations  I recommend a whole-food plant-based diet, an eating pattern that encourages the consumption of unrefined plant foods (such as fruits, vegetables, tubers, whole grains, legumes, nuts and seeds) and discourages meats, dairy products, eggs and processed foods.     The AHA/ACC recommends that the patient consume a dietary pattern that emphasizes intake of vegetables, fruits, and whole grains; includes low-fat dairy products, poultry, fish, legumes, non-tropical vegetable oils, and nuts; and limits intake of sodium, sweets, sugar-sweetened beverages, and red meats.  Adapt this dietary pattern to appropriate calorie requirements (a 500-750 kcal/day deficit to loose weight), personal and cultural food preferences, and nutrition therapy for other medical conditions (including diabetes).  Achieve this  pattern by following plans such as the [place] Mediterranean, DASH dietary pattern, or AHA diet.     Engage in 2 hours and 30 minutes per week of moderate-intensity physical activity, or 1 hour and 15 minutes (75 minutes) per week of vigorous-intensity aerobic physical activity, or an equivalent combination of moderate and vigorous-intensity aerobic physical activity. Aerobic activity should be performed in episodes of at least 10 minutes preferably spread throughout the week.     Adhering to a heart healthy diet, regular exercise habits, avoidance of tobacco products, and maintenance of a healthy weight are crucial components of their heart disease risk reduction.     Any positive review of systems not specifically addressed in the office visit today should be evaluated and treated by the patients primary care physician or in an emergency department if necessary     Patient was notified that results from ordered tests will be called to the patient if it changes current management; it will otherwise be discussed at a future appointment and available on  [email or web address].     Thank you for allowing me to participate in the care of this patient.        This document was generated using the assistance of voice recognition software. If there are any errors of spelling, grammar, syntax, or meaning; please feel free to contact me directly for clarification.    Past Medical History:  He has no past medical history on file.    Past Surgical History:  He has a past surgical history that includes Other surgical history (04/06/2022) and Other surgical history (08/11/2022).      Social History:  He reports that he has never smoked. He does not have any smokeless tobacco history on file. No history on file for alcohol use and drug use.    Family History:  No family history on file.     Allergies:  Relafen [nabumetone]    Outpatient Medications:  Current Outpatient Medications   Medication Instructions    ascorbic acid (VITAMIN C) 1,000  "mg, Daily    aspirin 81 mg EC tablet Daily RT    carvedilol (COREG) 12.5 mg, oral, 2 times daily (morning and late afternoon)    cholecalciferol (Vitamin D-3) 50 mcg (2,000 unit) capsule 1 capsule, Daily    ezetimibe (ZETIA) 10 mg, oral, Nightly    Farxiga 10 mg, oral, Daily before breakfast    fish oil concentrate (Omega-3) 120-180 mg capsule 1 capsule, Daily    naproxen sodium (Aleve) 220 mg tablet 1 tablet, Daily PRN    rosuvastatin (CRESTOR) 40 mg, oral, Daily    sacubitriL-valsartan (Entresto) 49-51 mg tablet 1 tablet, oral, 2 times daily    spironolactone (ALDACTONE) 25 mg, oral, Daily        ROS:  A 14 point review of systems was done and is negative other than as stated in HPI    Vitals:      5/13/2022     3:47 PM 8/11/2022     1:18 PM 2/23/2023    12:41 PM 9/22/2023     1:24 PM 3/22/2024     2:20 PM 9/25/2024     1:03 PM 11/20/2024    10:11 AM   Vitals   Systolic 146 118 130 148 144 132 138   Diastolic 88 70 82 74 74 78 77   BP Location      Left arm    Heart Rate 74 54 64 59 64 65 72   Resp 16 16        Height 1.753 m (5' 9\") 1.753 m (5' 9\") 1.753 m (5' 9\") 1.753 m (5' 9\") 1.753 m (5' 9\") 1.753 m (5' 9\")    Weight (lb) 259 255.13 249 245.38 246.8 244.2 245   BMI 38.25 kg/m2 37.68 kg/m2 36.77 kg/m2 36.24 kg/m2 36.45 kg/m2 36.06 kg/m2 36.18 kg/m2   BSA (m2) 2.39 m2 2.38 m2 2.35 m2 2.32 m2 2.34 m2 2.32 m2 2.32 m2        Physical Exam:   Constitutional: Cooperative, in no acute distress, alert, appears stated age.  Skin: Skin color, texture, turgor normal. No rashes or lesions.  Head: Normocephalic. No masses, lesions, tenderness or abnormalities  Eyes: Extraocular movements are grossly intact.  Mouth and throat: Mucous membranes moist  Neck: Neck supple, +right carotid bruit, no JVD  Respiratory: Lungs clear to auscultation, no wheezing or rhonchi, no use of accessory muscles  Chest wall: No scars, normal excursion with respiration  Cardiovascular: Regular rhythm without murmur  Gastrointestinal: Abdomen " soft, nontender. Bowel sounds normal.  Musculoskeletal: Strength equal in upper extremities  Extremities: 1+ pitting edema  Neurologic: Sensation grossly intact, alert and oriented ×3    Intake/Output:   No intake/output data recorded.    Outpatient Medications  Current Outpatient Medications on File Prior to Visit   Medication Sig Dispense Refill    ascorbic acid (Vitamin C) 1,000 mg tablet Take 1 tablet (1,000 mg) by mouth once daily.      aspirin 81 mg EC tablet Take by mouth once daily.      carvedilol (Coreg) 12.5 mg tablet TAKE 1 TABLET BY MOUTH TWICE DAILY WITH MEALS 180 tablet 3    cholecalciferol (Vitamin D-3) 50 mcg (2,000 unit) capsule Take 1 capsule (50 mcg) by mouth once daily.      dapagliflozin propanediol (Farxiga) 10 mg Take 1 tablet (10 mg) by mouth once daily in the morning. Take before meals. 90 tablet 3    ezetimibe (Zetia) 10 mg tablet TAKE 1 TABLET BY MOUTH AT BEDTIME 90 tablet 3    fish oil concentrate (Omega-3) 120-180 mg capsule Take 1 capsule (1 g) by mouth once daily.      naproxen sodium (Aleve) 220 mg tablet Take 1 tablet (220 mg) by mouth once daily as needed.      rosuvastatin (Crestor) 40 mg tablet Take 1 tablet (40 mg) by mouth once daily. 90 tablet 3    sacubitriL-valsartan (Entresto) 49-51 mg tablet Take 1 tablet by mouth 2 times a day. 180 tablet 3    spironolactone (Aldactone) 25 mg tablet Take 1 tablet by mouth once daily 90 tablet 1     No current facility-administered medications on file prior to visit.       Labs: (past 26 weeks)  Recent Results (from the past 26 weeks)   LAB COLOGUARD® COLON CANCER SCREEN    Collection Time: 10/31/24 10:35 AM   Result Value Ref Range    NONINV COLON CA DNA+OCC BLD SCRN STL QL Negative Negative   Lipid Panel    Collection Time: 01/29/25 10:15 AM   Result Value Ref Range    CHOLESTEROL, TOTAL 107 <200 mg/dL    HDL CHOLESTEROL 38 (L) > OR = 40 mg/dL    TRIGLYCERIDES 122 <150 mg/dL    LDL-CHOLESTEROL 49 mg/dL (calc)    CHOL/HDLC RATIO 2.8 <5.0  (calc)    NON HDL CHOLESTEROL 69 <130 mg/dL (calc)   Magnesium    Collection Time: 01/29/25 10:15 AM   Result Value Ref Range    MAGNESIUM 2.3 1.5 - 2.5 mg/dL   Comprehensive Metabolic Panel    Collection Time: 01/29/25 10:15 AM   Result Value Ref Range    GLUCOSE 106 (H) 65 - 99 mg/dL    UREA NITROGEN (BUN) 9 7 - 25 mg/dL    CREATININE 0.87 0.70 - 1.35 mg/dL    EGFR 93 > OR = 60 mL/min/1.73m2    SODIUM 139 135 - 146 mmol/L    POTASSIUM 4.4 3.5 - 5.3 mmol/L    CHLORIDE 107 98 - 110 mmol/L    CARBON DIOXIDE 24 20 - 32 mmol/L    ELECTROLYTE BALANCE 8 7 - 17 mmol/L (calc)    CALCIUM 8.9 8.6 - 10.3 mg/dL    PROTEIN, TOTAL 7.1 6.1 - 8.1 g/dL    ALBUMIN 4.0 3.6 - 5.1 g/dL    BILIRUBIN, TOTAL 0.7 0.2 - 1.2 mg/dL    ALKALINE PHOSPHATASE 55 35 - 144 U/L    AST 24 10 - 35 U/L    ALT 24 9 - 46 U/L   CBC    Collection Time: 01/29/25 10:15 AM   Result Value Ref Range    WHITE BLOOD CELL COUNT 8.0 3.8 - 10.8 Thousand/uL    RED BLOOD CELL COUNT 4.55 4.20 - 5.80 Million/uL    HEMOGLOBIN 15.3 13.2 - 17.1 g/dL    HEMATOCRIT 45.5 38.5 - 50.0 %    .0 80.0 - 100.0 fL    MCH 33.6 (H) 27.0 - 33.0 pg    MCHC 33.6 32.0 - 36.0 g/dL    RDW 13.1 11.0 - 15.0 %    PLATELET COUNT 157 140 - 400 Thousand/uL    MPV 11.7 7.5 - 12.5 fL   B-Type Natriuretic Peptide    Collection Time: 01/29/25 10:15 AM   Result Value Ref Range    B TYPE NATRIURETIC PEPTIDE (BNP) 169 (H) <100 pg/mL   Basic Metabolic Panel    Collection Time: 03/18/25  8:27 AM   Result Value Ref Range    GLUCOSE 98 65 - 139 mg/dL    UREA NITROGEN (BUN) 11 7 - 25 mg/dL    CREATININE 0.99 0.70 - 1.35 mg/dL    EGFR 82 > OR = 60 mL/min/1.73m2    BUN/CREATININE RATIO SEE NOTE: 6 - 22 (calc)    SODIUM 138 135 - 146 mmol/L    POTASSIUM 4.6 3.5 - 5.3 mmol/L    CHLORIDE 105 98 - 110 mmol/L    CARBON DIOXIDE 24 20 - 32 mmol/L    ELECTROLYTE BALANCE 9 7 - 17 mmol/L (calc)    CALCIUM 9.8 8.6 - 10.3 mg/dL       ECG  Encounter Date: 09/25/24   ECG 12 Lead    Narrative    Sinus rhythm, possible  inferior infarct age undetermined, possible   anterolateral infarct age undetermined, heart rate 65 bpm.       Echocardiogram  No results found for this or any previous visit from the past 1095 days.      CV Studies:  EKG:  Encounter Date: 09/25/24   ECG 12 Lead    Narrative    Sinus rhythm, possible inferior infarct age undetermined, possible   anterolateral infarct age undetermined, heart rate 65 bpm.     Echocardiogram:   [place]ocardi[place] [address]  Phone [phone] Fax [phone]    TRANSTHORACIC ECHOCARDIOGRAM REPORT      Patient Name:     DEBBIE PARKER KIRBY Reading Physician:  [id number] Jaleel Bowman MD  Study Date:       2/16/2023      Referring           JUAN DAVID AMBROSIO  Physician:  MRN/PID:          [id number]       PCP:  Accession/Order#: [id number]   [facility] Echo Lab  Location:  YOB: 1955      Fellow:  Gender:           M              Nurse:              Delaney Patel RN  Admit Date:                      Sonographer:        Bharati [facility]  Admission Status: Outpatient     Additional Staff:  Height:           175.26 cm      CC Report to:  Weight:           108.86 kg      Study Type:         Echocardiogram  BSA:              2.23 m2  Blood Pressure: 118 /70 mmHg    Diagnosis/ICD: R94.31-Abnormal electrocardiogram [ECG] [EKG];  I25.10-Atherosclerotic heart disease of native coronary artery  without angina pectoris; I50.20-Unspecified systolic (congestive)  heart failure (CHF)  Indication:    Congestive Heart Failure, Abnormal EKG  Procedure/CPT: Echo Complete w Full [id number]    Patient History:  Pertinent History: CAD, CABG (2013), HTN.    Study Detail: The following Echo studies were performed: 2D, M-Mode, Doppler and  color flow. Technically challenging study due to poor acoustic  windows. Optison used as a contrast agent for endocardial border  definition. Total contrast used for this  procedure was 3 mL via IV  push.      PHYSICIAN INTERPRETATION:  Left Ventricle: Left ventricular systolic function is mildly decreased, with an estimated ejection fraction of 45%. Wall motion is abnormal. The left ventricular cavity size is normal. Left ventricular diastolic filling was not assessed. Apical and anterolateral segments appear hypokinetic.  Left Atrium: The left atrium is normal in size.  Right Ventricle: The right ventricle is normal in size. There is mildly reduced right ventricular systolic function.  Right Atrium: The right atrium is normal in size.  Aortic Valve: The aortic valve is trileaflet. There is no evidence of aortic valve regurgitation. The peak instantaneous gradient of the aortic valve is 7.4 mmHg. The mean gradient of the aortic valve is 4.1 mmHg.  Mitral Valve: The mitral valve is normal in structure. There is trace mitral valve regurgitation.  Tricuspid Valve: The tricuspid valve is structurally normal. No evidence of tricuspid regurgitation.  Pulmonic Valve: The pulmonic valve is structurally normal. There is physiologic pulmonic valve regurgitation.  Pericardium: There is no pericardial effusion noted.  Aorta: The aortic root is normal.      CONCLUSIONS:  1. Left ventricular systolic function is mildly decreased with a 45% estimated ejection fraction.  2. Apical and anterolateral segments appear hypokinetic.  3. There is mildly reduced right ventricular systolic function.    QUANTITATIVE DATA SUMMARY:  2D MEASUREMENTS:  Normal Ranges:  LAs:           4.15 cm    (2.7-4.0cm)  IVSd:          1.04 cm    (0.6-1.1cm)  LVPWd:         1.00 cm    (0.6-1.1cm)  LVIDd:         6.26 cm    (3.9-5.9cm)  LVIDs:         5.26 cm  LV Mass Index: 121.8 g/m2  LV % FS        16.0 %    LA VOLUME:  Normal Ranges:  LA Vol A4C:        58.9 ml    (22+/-6mL/m2)  LA Vol A2C:        53.2 ml  LA Vol BP:         56.4 ml  LA Vol Index A4C:  26.4 ml/m2  LA Vol Index A2C:  23.8 ml/m2  LA Vol Index BP:   25.3  ml/m2  LA Area A4C:       20.9 cm2  LA Area A2C:       19.7 cm2  LA Major Axis A4C: 6.3 cm  LA Major Axis A2C: 6.2 cm  LA Volume Index:   25.3 ml/m2  LA Vol A4C:        55.7 ml  LA Vol A2C:        50.0 ml    RA VOLUME BY A/L METHOD:  Normal Ranges:  RA Area A4C: 18.5 cm2    AORTA MEASUREMENTS:  Normal Ranges:  Ao Sinus, d: 3.60 cm (2.1-3.5cm)  Ao STJ, d:   2.90 cm (1.7-3.4cm)  Asc Ao, d:   3.10 cm (2.1-3.4cm)    LV SYSTOLIC FUNCTION BY 2D PLANIMETRY (MOD):  Normal Ranges:  EF-A4C View: 45.1 % (>=55%)  EF-A2C View: 38.9 %  EF-Biplane:  44.1 %    LV DIASTOLIC FUNCTION:  Normal Ranges:  MV Peak E:        1.09 m/s    (0.7-1.2 m/s)  MV Peak A:        0.88 m/s    (0.42-0.7 m/s)  E/A Ratio:        1.24        (1.0-2.2)  MV A Dur:         121.79 msec  PulmV A Revs Dur: 163.65 msec    MITRAL VALVE:  Normal Ranges:  MV DT: 241 msec (150-240msec)    MITRAL INSUFFICIENCY:  Normal Ranges:  PISA Radius:  0.5 cm  MR VTI:       167.73 cm  MR Vmax:      483.82 cm/s  MR Alias Yasmany: 39.3 cm/s  MR Volume:    24.08 ml  MR Flow Rt:   69.47 ml/s  MR EROA:      0.14 cm2  dP/dt:        855 mmHg/s  (>1200mmHg/sec)    AORTIC VALVE:  Normal Ranges:  AoV Vmax:                1.36 m/s (<=1.7m/s)  AoV Peak P.4 mmHg (<20mmHg)  AoV Mean P.1 mmHg (1.7-11.5mmHg)  LVOT Max Yasmany:            1.01 m/s (<=1.1m/s)  AoV VTI:                 32.26 cm (18-25cm)  LVOT VTI:                23.79 cm  LVOT Diameter:           2.24 cm  (1.8-2.4cm)  AoV Area, VTI:           2.90 cm2 (2.5-5.5cm2)  AoV Area,Vmax:           2.91 cm2 (2.5-4.5cm2)  AoV Dimensionless Index: 0.74    RIGHT VENTRICLE:  RV 1   3.4 cm  RV 2   2.5 cm  RV 3   7.7 cm  TAPSE: 19.6 mm  RV s'  0.11 m/s    TRICUSPID VALVE/RVSP:  Normal Ranges:  Peak TR Velocity: 2.24 m/s  RV Syst Pressure: 23.1 mmHg (< 30mmHg)  TV E Vmax:        0.46 m/s  (0.3-0.7m/s)  TV A Vmax:        0.45 m/s  IVC Diam:         1.60 cm  TV e'             0.1 m/s    Pulmonary Veins:  PulmV A Revs Dur:  163.65 msec    AORTA:  Asc Ao Diam 3.15 cm      [id number] Jaleel Bowman MD  Electronically signed on 2/16/2023 at 4:53:15 PM         Final     Stress Testing SHARYN(BMI6902:1:1825): No results found for this or any previous visit from the past 1825 days.    Cardiac Catheterization:   Adult Cath     [facility], Cath Lab  [address] [place] [address]  Phone [phone] Fax [phone]    Cardiovascular Catheterization Report    Patient Name:     DEBBIE ORR Performing Physician: [id number] Andrea Azar MD  Study Date:       4/21/2022      Verifying Physician:  [id number] Azar MD  MRN/PID:          [id number]       Cardiologist:  Accession/Order#: [id number]      Referring Physician:  [id number] Shailesh Dash DO  YOB: 1955      Referring Physician:  Gender:           M              Referring Physician:      Study: Left Heart Catheterization      Indications:  DEBBIE ORR is a 67 year old male who presents with hypertension, dyslipidemia, prior coronary artery bypass graft surgery and prior percutaneous coronary intervention. Cardiomyopathy and left ventricular dysfunction, with a chest pain assessment of atypical angina. Study performed as an elective cath procedure.    Medical History:  Stress test performed: No. CTA performed: No. [place] accessed: No. LVEF Assessed: Yes.    Procedure Description:  After infiltration with 2% Lidocaine, the right femoral artery was cannulated with a modified Seldinger technique. Subsequently a 6 [place] sheath was placed in the right femoral artery. Selective coronary catheterization was performed using a 6 Fr catheter(s) exchanged over a guide wire to cannulate the coronary arteries. A JL 4 tip catheter was used for left coronary injections. A JR 4 tip catheter was used for right coronary injections.  Multiple injections of contrast were made into the left and right coronary arteries with angiograms recorded in multiple projections.  After completion of the procedure, femoral artery angiography was performed. This demonstrated a common femoral artery puncture appropriate for closure. An Angio-Seal Evolution 6F ([employer]) vascular closure device was placed per protocol.    Coronary Angiography:  The coronary circulation is right dominant.    Left Main Coronary Artery:  The left main coronary artery is a normal caliber vessel. The left main arises normally from the left coronary sinus of Valsalva and bifurcates into the LAD and circumflex coronary arteries. The left main coronary artery showed no significant disease or stenosis greater than 30%.    Left Anterior Descending Coronary Artery Distribution:  The left anterior descending coronary artery is a normal caliber vessel. The LAD arises normally from the left main coronary artery. The LAD demonstrated chronic occlusion and total occlusion.    Circumflex Coronary Artery Distribution:  The circumflex coronary artery is a normal caliber vessel. The circumflex arises normally from the left main coronary artery and terminates in the AV groove. The circumflex revealed mild atherosclerotic disease.    Right Coronary Artery Distribution:    The right coronary artery is a normal caliber vessel. The RCA arises normally from the right sinus of Valsalva. The RCA showed moderate atherosclerotic disease and diffuse atherosclerotic disease.    Coronary Grafts:    LIMA Graft:  The left internal mammary artery graft conduit originating in situ and attached to the mid left anterior descending is widely patent.  Saphaneous Vein Graft(s):  The saphenous vein graft, originating from the aorta and attached to the 2nd obtuse marginal appeared totally occluded.  The 2nd saphenous vein graft, originating from the aorta and attached to the distal right coronary artery is patent.      Valve Findings:  No aortic valve stenosis is visualized.    Graft Stenosis Summary:  Graft     Destination of Graft  LIMA  mid  left anterior descending  SVG 1 2nd obtuse marginal  SVG 2 distal right coronary artery          Complications:  No in-lab complications observed.    Cardiac Cath Transition of Care Summary:  Post Procedure Diagnosis: Triple vessel disease and Patent LIMA to LAD and SVG  to RCA.  Blood Loss:               Estimated blood loss during the procedure was 0 mls.  Specimens Removed:        Number of specimen(s) removed: none.      Recommendations:  Maximize medical therapy.    ____________________________________________________________________________________  CONCLUSIONS:  1. Triple vessel disease.  2. Patent SVG to RCA and LIMA to LAD and occluded SVG to OM.  3. Left Ventricular end-diastolic pressure = 15.  4. Normal LV filling pressures.    ____________________________________________________________________________________  CPT Codes:  Left Heart Cath Bypass Graft w ventriculography and coronary angio(C)-63588; Moderate Sedation Services initial 15 minutes patient >5 years-65991; Moderate Sedation Services 1st additional 15 minutes patient >5 years-04964    ICD 10 Codes:  I25.5-Ischemic cardiomyopathy    [id number] Andrea Azar MD  Performing Physician  Electronically signed by [id number] Andrea Azar MD on 4/21/2022 at 11:31:16 AM      cc Report to: [id number] Shailesh Dash DO           Final   No results found for this or any previous visit from the past 3650 days.     Cardiac Scoring: No results found for this or any previous visit from the past 1825 days.    AAA : No results found for this or any previous visit from the past 1825 days.    OTHER: No results found for this or any previous visit from the past 1825 days.    LAST IMAGING RESULTS  Vascular US Carotid Artery Duplex [place] [id number]       Phone [phone] Fax [phone]       Vascular Lab Report     VASC US CAROTID ARTERY DUPLEX BILATERAL    Patient Name:      DEBBIE Oleary  Physician:  [id number] Angy Conner MD  Study Date:        10/23/2024           Ordering Provider:  [id number] JUAN DAVID FERGUSON DAILY  MRN/PID:           [id number]             Fellow:  Accession#:        [id number]         Technologist:       Virgen Stephens RVT  Date of Birth/Age: 1955 / 69 years Technologist 2:  Gender:            M                    Encounter#:         [id number]  Admission Status:  Outpatient           Location Performed: [facility]       Diagnosis/ICD: Other specified symptoms and signs involving the circulatory and                 respiratory systems-R09.89  CPT Codes:     25808 Cerebrovascular Carotid Duplex scan complete       Smoker: Former.       CONCLUSIONS:  Right Carotid: Findings are consistent with less than 50% stenosis of the right proximal internal carotid artery. Right external carotid artery appears patent with no evidence of stenosis. The right vertebral artery is patent with antegrade flow.  Left Carotid: Findings are consistent with 50 to 69% stenosis of the left proximal internal carotid artery. Turbulent flow seen by color Doppler. Left external carotid artery appears patent with no evidence of stenosis. The left vertebral artery is patent with antegrade flow. No evidence of hemodynamically significant stenosis in the left subclavian artery.     Imaging & Doppler Findings:  Right Plaque Morph: The proximal right internal carotid artery demonstrates smooth and calcified plaque. The proximal right external carotid artery demonstrates calcified plaque.  Left Plaque Morph: The proximal left internal carotid artery demonstrates smooth plaque. The proximal left external carotid artery demonstrates smooth plaque.      Right                       Left    PSV      EDV               PSV      EDV  100 cm/s           CCA P   123 cm/s  114 cm/s           CCA D   98 cm/s  123 cm/s 32 cm/s   ICA P   213 cm/s 66 cm/s  80 cm/s  24 cm/s   ICA D   80 cm/s  23 cm/s  157 cm/s            ECA    125  cm/s  62 cm/s  20 cm/s Vertebral 54 cm/s  15 cm/s     Right                                  Left    PSV    Waveform                       PSV    Waveform  156 cm/s          Subclavian Proximal 119 cm/s                  Right Left  ICA/CCA Ratio  1.1  2.2          [id number] Angy Conner MD  Electronically signed by [id number] Angy Conner MD on 10/23/2024 at 3:11:52 PM       ** Final **      Problem List Items Addressed This Visit       CAD (coronary artery disease)    Dyslipidemia    Essential hypertension    Obesity (BMI 30-39.9)    Heart failure with mid-range ejection fraction (HFmEF) - Primary    Carotid artery disease (CMS-HCC)          Shailesh Dash DO, FACC, FACOI

## 2025-03-20 ENCOUNTER — PHARMACY VISIT (OUTPATIENT)
Dept: PHARMACY | Facility: CLINIC | Age: 70
End: 2025-03-20
Payer: MEDICARE

## 2025-03-21 DIAGNOSIS — E78.5 DYSLIPIDEMIA: ICD-10-CM

## 2025-03-25 RX ORDER — EZETIMIBE 10 MG/1
10 TABLET ORAL NIGHTLY
Qty: 90 TABLET | Refills: 1 | Status: SHIPPED | OUTPATIENT
Start: 2025-03-25

## 2025-03-27 DIAGNOSIS — I25.10 CORONARY ARTERY DISEASE INVOLVING NATIVE CORONARY ARTERY OF NATIVE HEART, UNSPECIFIED WHETHER ANGINA PRESENT: ICD-10-CM

## 2025-03-27 DIAGNOSIS — I50.20 HFREF (HEART FAILURE WITH REDUCED EJECTION FRACTION): ICD-10-CM

## 2025-03-27 DIAGNOSIS — E78.5 DYSLIPIDEMIA: ICD-10-CM

## 2025-03-27 RX ORDER — ROSUVASTATIN CALCIUM 40 MG/1
40 TABLET, COATED ORAL DAILY
Qty: 90 TABLET | Refills: 1 | Status: SHIPPED | OUTPATIENT
Start: 2025-03-27

## 2025-03-27 RX ORDER — SPIRONOLACTONE 25 MG/1
25 TABLET ORAL DAILY
Qty: 90 TABLET | Refills: 1 | Status: SHIPPED | OUTPATIENT
Start: 2025-03-27

## 2025-03-31 ENCOUNTER — OFFICE VISIT (OUTPATIENT)
Dept: CARDIOLOGY | Facility: HOSPITAL | Age: 70
End: 2025-03-31
Payer: MEDICARE

## 2025-03-31 VITALS
HEIGHT: 69 IN | WEIGHT: 245 LBS | SYSTOLIC BLOOD PRESSURE: 126 MMHG | DIASTOLIC BLOOD PRESSURE: 80 MMHG | BODY MASS INDEX: 36.29 KG/M2 | HEART RATE: 65 BPM

## 2025-03-31 DIAGNOSIS — I10 ESSENTIAL HYPERTENSION: ICD-10-CM

## 2025-03-31 DIAGNOSIS — E66.9 OBESITY (BMI 30-39.9): ICD-10-CM

## 2025-03-31 DIAGNOSIS — I25.10 CORONARY ARTERY DISEASE INVOLVING NATIVE CORONARY ARTERY OF NATIVE HEART WITHOUT ANGINA PECTORIS: ICD-10-CM

## 2025-03-31 DIAGNOSIS — E78.5 DYSLIPIDEMIA: ICD-10-CM

## 2025-03-31 DIAGNOSIS — I65.23 BILATERAL CAROTID ARTERY STENOSIS: ICD-10-CM

## 2025-03-31 DIAGNOSIS — R09.89 BRUIT OF RIGHT CAROTID ARTERY: ICD-10-CM

## 2025-03-31 DIAGNOSIS — I50.22 HEART FAILURE WITH MID-RANGE EJECTION FRACTION (HFMEF): Primary | ICD-10-CM

## 2025-03-31 LAB
ATRIAL RATE: 65 BPM
P AXIS: 10 DEGREES
P OFFSET: 174 MS
P ONSET: 131 MS
PR INTERVAL: 166 MS
Q ONSET: 214 MS
QRS COUNT: 10 BEATS
QRS DURATION: 86 MS
QT INTERVAL: 394 MS
QTC CALCULATION(BAZETT): 409 MS
QTC FREDERICIA: 404 MS
R AXIS: 107 DEGREES
T AXIS: -9 DEGREES
T OFFSET: 411 MS
VENTRICULAR RATE: 65 BPM

## 2025-03-31 PROCEDURE — 93005 ELECTROCARDIOGRAM TRACING: CPT | Performed by: INTERNAL MEDICINE

## 2025-03-31 PROCEDURE — 99214 OFFICE O/P EST MOD 30 MIN: CPT | Mod: 25 | Performed by: INTERNAL MEDICINE

## 2025-03-31 PROCEDURE — 1159F MED LIST DOCD IN RCRD: CPT | Performed by: INTERNAL MEDICINE

## 2025-03-31 PROCEDURE — 3008F BODY MASS INDEX DOCD: CPT | Performed by: INTERNAL MEDICINE

## 2025-03-31 PROCEDURE — 99214 OFFICE O/P EST MOD 30 MIN: CPT | Performed by: INTERNAL MEDICINE

## 2025-03-31 PROCEDURE — 93010 ELECTROCARDIOGRAM REPORT: CPT | Performed by: INTERNAL MEDICINE

## 2025-03-31 PROCEDURE — 3079F DIAST BP 80-89 MM HG: CPT | Performed by: INTERNAL MEDICINE

## 2025-03-31 PROCEDURE — 3074F SYST BP LT 130 MM HG: CPT | Performed by: INTERNAL MEDICINE

## 2025-04-16 ENCOUNTER — HOSPITAL ENCOUNTER (OUTPATIENT)
Dept: CARDIOLOGY | Facility: HOSPITAL | Age: 70
Discharge: HOME | End: 2025-04-16
Payer: MEDICARE

## 2025-04-16 DIAGNOSIS — E78.5 DYSLIPIDEMIA: ICD-10-CM

## 2025-04-16 DIAGNOSIS — I50.22 HEART FAILURE WITH MID-RANGE EJECTION FRACTION (HFMEF): ICD-10-CM

## 2025-04-16 DIAGNOSIS — I65.23 BILATERAL CAROTID ARTERY STENOSIS: ICD-10-CM

## 2025-04-16 DIAGNOSIS — I25.10 CORONARY ARTERY DISEASE INVOLVING NATIVE CORONARY ARTERY OF NATIVE HEART WITHOUT ANGINA PECTORIS: ICD-10-CM

## 2025-04-16 DIAGNOSIS — R09.89 BRUIT OF RIGHT CAROTID ARTERY: ICD-10-CM

## 2025-04-16 DIAGNOSIS — E66.9 OBESITY (BMI 30-39.9): ICD-10-CM

## 2025-04-16 DIAGNOSIS — I10 ESSENTIAL HYPERTENSION: ICD-10-CM

## 2025-04-16 LAB
EJECTION FRACTION APICAL 4 CHAMBER: 49.8
EJECTION FRACTION: 47 %
LEFT ATRIUM VOLUME AREA LENGTH INDEX BSA: 30.3 ML/M2
LEFT VENTRICLE INTERNAL DIMENSION DIASTOLE: 6.22 CM (ref 3.5–6)
LEFT VENTRICULAR OUTFLOW TRACT DIAMETER: 2.26 CM
LV EJECTION FRACTION BIPLANE: 47 %
MITRAL VALVE E/A RATIO: 1.29
RIGHT VENTRICLE FREE WALL PEAK S': 10.31 CM/S
TRICUSPID ANNULAR PLANE SYSTOLIC EXCURSION: 2.2 CM

## 2025-04-16 PROCEDURE — C8929 TTE W OR WO FOL WCON,DOPPLER: HCPCS

## 2025-04-16 PROCEDURE — 2500000004 HC RX 250 GENERAL PHARMACY W/ HCPCS (ALT 636 FOR OP/ED): Performed by: INTERNAL MEDICINE

## 2025-04-16 PROCEDURE — 93306 TTE W/DOPPLER COMPLETE: CPT | Performed by: INTERNAL MEDICINE

## 2025-04-16 RX ADMIN — HUMAN ALBUMIN MICROSPHERES AND PERFLUTREN 0.5 ML: 10; .22 INJECTION, SOLUTION INTRAVENOUS at 13:25

## 2025-04-17 ENCOUNTER — TELEPHONE (OUTPATIENT)
Dept: CARDIOLOGY | Facility: HOSPITAL | Age: 70
End: 2025-04-17
Payer: MEDICARE

## 2025-04-17 NOTE — TELEPHONE ENCOUNTER
4/21/25  1128  Called echocardiogram results and continuation of current care directive to patient with patient verbalizing understanding.      4/18/25  Called patient; no answer. Left voice message for patient to return call for echocardiogram results.    4/17/25  1016  Called patient; no answer. Left voice message for patient to return call for echocardiogram results and directives from Dr. Dash.        ----- Message from Shailesh Dash sent at 4/16/2025  3:05 PM EDT -----  Echocardiogram shows mildly reduced left ventricular systolic function with an ejection fraction of 47%, abnormal diastolic function, mildly reduced right ventricular systolic function, and mild   mitral valve regurgitation.  LV function is similar to prior echocardiogram.  Continue current medical care.  ----- Message -----  From: [facility] - Cardiology Results In  Sent: 4/16/2025   2:54 PM EDT  To: Shailesh Dash, DO

## 2025-04-28 ENCOUNTER — APPOINTMENT (OUTPATIENT)
Dept: PHARMACY | Facility: HOSPITAL | Age: 70
End: 2025-04-28
Payer: MEDICARE

## 2025-06-23 DIAGNOSIS — I25.10 CORONARY ARTERY DISEASE INVOLVING NATIVE CORONARY ARTERY OF NATIVE HEART WITHOUT ANGINA PECTORIS: ICD-10-CM

## 2025-06-23 DIAGNOSIS — I10 ESSENTIAL HYPERTENSION: ICD-10-CM

## 2025-06-24 RX ORDER — CARVEDILOL 12.5 MG/1
12.5 TABLET ORAL
Qty: 180 TABLET | Refills: 3 | Status: SHIPPED | OUTPATIENT
Start: 2025-06-24

## 2025-07-05 PROCEDURE — RXMED WILLOW AMBULATORY MEDICATION CHARGE

## 2025-07-10 ENCOUNTER — PHARMACY VISIT (OUTPATIENT)
Dept: PHARMACY | Facility: CLINIC | Age: 70
End: 2025-07-10
Payer: MEDICARE